# Patient Record
Sex: MALE | Race: WHITE | NOT HISPANIC OR LATINO
[De-identification: names, ages, dates, MRNs, and addresses within clinical notes are randomized per-mention and may not be internally consistent; named-entity substitution may affect disease eponyms.]

---

## 2017-02-15 ENCOUNTER — MEDICATION RENEWAL (OUTPATIENT)
Age: 57
End: 2017-02-15

## 2017-03-22 ENCOUNTER — APPOINTMENT (OUTPATIENT)
Dept: FAMILY MEDICINE | Facility: CLINIC | Age: 57
End: 2017-03-22

## 2017-03-22 VITALS
HEIGHT: 69 IN | WEIGHT: 185 LBS | SYSTOLIC BLOOD PRESSURE: 130 MMHG | DIASTOLIC BLOOD PRESSURE: 80 MMHG | BODY MASS INDEX: 27.4 KG/M2

## 2017-03-22 DIAGNOSIS — L04.9 ACUTE LYMPHADENITIS, UNSPECIFIED: ICD-10-CM

## 2017-03-22 DIAGNOSIS — R53.81 OTHER MALAISE: ICD-10-CM

## 2017-03-22 DIAGNOSIS — R53.83 OTHER MALAISE: ICD-10-CM

## 2017-03-22 DIAGNOSIS — R68.84 JAW PAIN: ICD-10-CM

## 2017-03-22 DIAGNOSIS — R10.9 UNSPECIFIED ABDOMINAL PAIN: ICD-10-CM

## 2017-03-22 LAB
BASOPHILS # BLD AUTO: 0.05 K/UL
BASOPHILS NFR BLD AUTO: 0.6 %
EOSINOPHIL # BLD AUTO: 0.13 K/UL
EOSINOPHIL NFR BLD AUTO: 1.6 %
HCT VFR BLD CALC: 44.7 %
HGB BLD-MCNC: 15.5 G/DL
IMM GRANULOCYTES NFR BLD AUTO: 0.5 %
LYMPHOCYTES # BLD AUTO: 2.52 K/UL
LYMPHOCYTES NFR BLD AUTO: 31.1 %
MAN DIFF?: NORMAL
MCHC RBC-ENTMCNC: 33.8 PG
MCHC RBC-ENTMCNC: 34.7 GM/DL
MCV RBC AUTO: 97.6 FL
MONOCYTES # BLD AUTO: 0.62 K/UL
MONOCYTES NFR BLD AUTO: 7.7 %
NEUTROPHILS # BLD AUTO: 4.73 K/UL
NEUTROPHILS NFR BLD AUTO: 58.5 %
PLATELET # BLD AUTO: 245 K/UL
RBC # BLD: 4.58 M/UL
RBC # FLD: 12.1 %
WBC # FLD AUTO: 8.09 K/UL

## 2017-03-23 LAB
ALBUMIN SERPL ELPH-MCNC: 4.5 G/DL
ALP BLD-CCNC: 83 U/L
ALT SERPL-CCNC: 25 U/L
ANION GAP SERPL CALC-SCNC: 18 MMOL/L
AST SERPL-CCNC: 24 U/L
BILIRUB SERPL-MCNC: 0.4 MG/DL
BUN SERPL-MCNC: 11 MG/DL
CALCIUM SERPL-MCNC: 9.6 MG/DL
CHLORIDE SERPL-SCNC: 101 MMOL/L
CHOLEST SERPL-MCNC: 204 MG/DL
CHOLEST/HDLC SERPL: 4.3 RATIO
CO2 SERPL-SCNC: 21 MMOL/L
CREAT SERPL-MCNC: 0.96 MG/DL
GLUCOSE SERPL-MCNC: 87 MG/DL
HDLC SERPL-MCNC: 47 MG/DL
LDLC SERPL CALC-MCNC: 129 MG/DL
POTASSIUM SERPL-SCNC: 4.3 MMOL/L
PROT SERPL-MCNC: 7.1 G/DL
SODIUM SERPL-SCNC: 140 MMOL/L
TRIGL SERPL-MCNC: 139 MG/DL
TSH SERPL-ACNC: 0.94 UIU/ML

## 2017-04-08 ENCOUNTER — TRANSCRIPTION ENCOUNTER (OUTPATIENT)
Age: 57
End: 2017-04-08

## 2017-06-02 ENCOUNTER — APPOINTMENT (OUTPATIENT)
Dept: FAMILY MEDICINE | Facility: CLINIC | Age: 57
End: 2017-06-02

## 2017-06-02 VITALS — DIASTOLIC BLOOD PRESSURE: 86 MMHG | SYSTOLIC BLOOD PRESSURE: 130 MMHG

## 2017-06-02 VITALS
HEIGHT: 69 IN | SYSTOLIC BLOOD PRESSURE: 150 MMHG | BODY MASS INDEX: 26.96 KG/M2 | DIASTOLIC BLOOD PRESSURE: 90 MMHG | WEIGHT: 182 LBS

## 2017-06-03 LAB
ALBUMIN SERPL ELPH-MCNC: 4.6 G/DL
ALP BLD-CCNC: 86 U/L
ALT SERPL-CCNC: 27 U/L
ANION GAP SERPL CALC-SCNC: 14 MMOL/L
APPEARANCE: CLEAR
AST SERPL-CCNC: 21 U/L
BACTERIA: NEGATIVE
BASOPHILS # BLD AUTO: 0.03 K/UL
BASOPHILS NFR BLD AUTO: 0.4 %
BILIRUB SERPL-MCNC: 0.5 MG/DL
BILIRUBIN URINE: NEGATIVE
BLOOD URINE: NEGATIVE
BUN SERPL-MCNC: 13 MG/DL
CALCIUM SERPL-MCNC: 9.4 MG/DL
CHLORIDE SERPL-SCNC: 99 MMOL/L
CHOLEST SERPL-MCNC: 211 MG/DL
CHOLEST/HDLC SERPL: 4.4 RATIO
CO2 SERPL-SCNC: 24 MMOL/L
COLOR: YELLOW
CREAT SERPL-MCNC: 0.93 MG/DL
EOSINOPHIL # BLD AUTO: 0.11 K/UL
EOSINOPHIL NFR BLD AUTO: 1.6 %
GLUCOSE QUALITATIVE U: NORMAL MG/DL
GLUCOSE SERPL-MCNC: 103 MG/DL
HCT VFR BLD CALC: 44.7 %
HDLC SERPL-MCNC: 48 MG/DL
HGB BLD-MCNC: 15.8 G/DL
HYALINE CASTS: 0 /LPF
IMM GRANULOCYTES NFR BLD AUTO: 0.4 %
KETONES URINE: NEGATIVE
LDLC SERPL CALC-MCNC: 128 MG/DL
LEUKOCYTE ESTERASE URINE: NEGATIVE
LYMPHOCYTES # BLD AUTO: 2.4 K/UL
LYMPHOCYTES NFR BLD AUTO: 35.8 %
MAN DIFF?: NORMAL
MCHC RBC-ENTMCNC: 35 PG
MCHC RBC-ENTMCNC: 35.3 GM/DL
MCV RBC AUTO: 98.9 FL
MICROSCOPIC-UA: NORMAL
MONOCYTES # BLD AUTO: 0.56 K/UL
MONOCYTES NFR BLD AUTO: 8.4 %
NEUTROPHILS # BLD AUTO: 3.57 K/UL
NEUTROPHILS NFR BLD AUTO: 53.4 %
NITRITE URINE: NEGATIVE
PH URINE: 6.5
PLATELET # BLD AUTO: 226 K/UL
POTASSIUM SERPL-SCNC: 4.4 MMOL/L
PROT SERPL-MCNC: 7.3 G/DL
PROTEIN URINE: NEGATIVE MG/DL
PSA SERPL-MCNC: 0.45 NG/ML
RBC # BLD: 4.52 M/UL
RBC # FLD: 12.2 %
RED BLOOD CELLS URINE: 0 /HPF
SODIUM SERPL-SCNC: 137 MMOL/L
SPECIFIC GRAVITY URINE: 1.01
SQUAMOUS EPITHELIAL CELLS: 0 /HPF
TRIGL SERPL-MCNC: 177 MG/DL
TSH SERPL-ACNC: 1.07 UIU/ML
UROBILINOGEN URINE: NORMAL MG/DL
WBC # FLD AUTO: 6.7 K/UL
WHITE BLOOD CELLS URINE: 0 /HPF

## 2017-08-03 ENCOUNTER — APPOINTMENT (OUTPATIENT)
Dept: OTOLARYNGOLOGY | Facility: CLINIC | Age: 57
End: 2017-08-03
Payer: COMMERCIAL

## 2017-08-03 VITALS
BODY MASS INDEX: 26.96 KG/M2 | HEIGHT: 69 IN | DIASTOLIC BLOOD PRESSURE: 85 MMHG | WEIGHT: 182 LBS | HEART RATE: 60 BPM | SYSTOLIC BLOOD PRESSURE: 154 MMHG

## 2017-08-03 PROCEDURE — 99244 OFF/OP CNSLTJ NEW/EST MOD 40: CPT | Mod: 25

## 2017-08-03 PROCEDURE — 31575 DIAGNOSTIC LARYNGOSCOPY: CPT

## 2017-08-03 PROCEDURE — 69210 REMOVE IMPACTED EAR WAX UNI: CPT

## 2017-09-14 ENCOUNTER — APPOINTMENT (OUTPATIENT)
Dept: OTOLARYNGOLOGY | Facility: CLINIC | Age: 57
End: 2017-09-14
Payer: COMMERCIAL

## 2017-09-14 VITALS
BODY MASS INDEX: 26.96 KG/M2 | WEIGHT: 182 LBS | DIASTOLIC BLOOD PRESSURE: 81 MMHG | SYSTOLIC BLOOD PRESSURE: 136 MMHG | HEART RATE: 70 BPM | HEIGHT: 69 IN

## 2017-09-14 PROCEDURE — 31575 DIAGNOSTIC LARYNGOSCOPY: CPT

## 2017-09-14 PROCEDURE — 99213 OFFICE O/P EST LOW 20 MIN: CPT | Mod: 25

## 2017-09-21 ENCOUNTER — TRANSCRIPTION ENCOUNTER (OUTPATIENT)
Age: 57
End: 2017-09-21

## 2017-10-10 ENCOUNTER — APPOINTMENT (OUTPATIENT)
Dept: FAMILY MEDICINE | Facility: CLINIC | Age: 57
End: 2017-10-10
Payer: COMMERCIAL

## 2017-10-10 VITALS
WEIGHT: 182 LBS | DIASTOLIC BLOOD PRESSURE: 78 MMHG | HEIGHT: 69 IN | SYSTOLIC BLOOD PRESSURE: 136 MMHG | BODY MASS INDEX: 26.96 KG/M2

## 2017-10-10 DIAGNOSIS — T14.8XXA OTHER INJURY OF UNSPECIFIED BODY REGION, INITIAL ENCOUNTER: ICD-10-CM

## 2017-10-10 DIAGNOSIS — R07.81 PLEURODYNIA: ICD-10-CM

## 2017-10-10 PROCEDURE — 99213 OFFICE O/P EST LOW 20 MIN: CPT

## 2017-12-14 ENCOUNTER — APPOINTMENT (OUTPATIENT)
Dept: OTOLARYNGOLOGY | Facility: CLINIC | Age: 57
End: 2017-12-14

## 2017-12-16 ENCOUNTER — RX RENEWAL (OUTPATIENT)
Age: 57
End: 2017-12-16

## 2018-03-19 ENCOUNTER — RX RENEWAL (OUTPATIENT)
Age: 58
End: 2018-03-19

## 2018-03-20 ENCOUNTER — APPOINTMENT (OUTPATIENT)
Dept: FAMILY MEDICINE | Facility: CLINIC | Age: 58
End: 2018-03-20
Payer: COMMERCIAL

## 2018-03-20 VITALS
DIASTOLIC BLOOD PRESSURE: 90 MMHG | HEART RATE: 65 BPM | SYSTOLIC BLOOD PRESSURE: 150 MMHG | BODY MASS INDEX: 26.96 KG/M2 | OXYGEN SATURATION: 94 % | HEIGHT: 69 IN | WEIGHT: 182 LBS

## 2018-03-20 PROCEDURE — 99214 OFFICE O/P EST MOD 30 MIN: CPT | Mod: 25

## 2018-03-20 PROCEDURE — 36415 COLL VENOUS BLD VENIPUNCTURE: CPT

## 2018-03-20 RX ORDER — VARENICLINE TARTRATE 0.5 (11)-1
0.5 MG X 11 & KIT ORAL
Qty: 1 | Refills: 0 | Status: DISCONTINUED | COMMUNITY
Start: 2017-06-02 | End: 2018-03-20

## 2018-03-20 RX ORDER — KETOCONAZOLE 20 MG/G
2 CREAM TOPICAL TWICE DAILY
Qty: 60 | Refills: 0 | Status: DISCONTINUED | COMMUNITY
Start: 2017-04-24 | End: 2018-03-20

## 2018-03-21 LAB
ALBUMIN SERPL ELPH-MCNC: 4.1 G/DL
ALP BLD-CCNC: 75 U/L
ALT SERPL-CCNC: 20 U/L
ANION GAP SERPL CALC-SCNC: 13 MMOL/L
AST SERPL-CCNC: 18 U/L
BASOPHILS # BLD AUTO: 0.06 K/UL
BASOPHILS NFR BLD AUTO: 0.8 %
BILIRUB SERPL-MCNC: 0.3 MG/DL
BUN SERPL-MCNC: 13 MG/DL
CALCIUM SERPL-MCNC: 8.9 MG/DL
CHLORIDE SERPL-SCNC: 104 MMOL/L
CHOLEST SERPL-MCNC: 209 MG/DL
CHOLEST/HDLC SERPL: 4.8 RATIO
CO2 SERPL-SCNC: 24 MMOL/L
CREAT SERPL-MCNC: 0.98 MG/DL
EOSINOPHIL # BLD AUTO: 0.15 K/UL
EOSINOPHIL NFR BLD AUTO: 2 %
GLUCOSE SERPL-MCNC: 95 MG/DL
HCT VFR BLD CALC: 45 %
HDLC SERPL-MCNC: 44 MG/DL
HGB BLD-MCNC: 14.9 G/DL
IMM GRANULOCYTES NFR BLD AUTO: 0.3 %
LDLC SERPL CALC-MCNC: 133 MG/DL
LYMPHOCYTES # BLD AUTO: 1.93 K/UL
LYMPHOCYTES NFR BLD AUTO: 26.2 %
MAN DIFF?: NORMAL
MCHC RBC-ENTMCNC: 33.1 GM/DL
MCHC RBC-ENTMCNC: 34.2 PG
MCV RBC AUTO: 103.2 FL
MONOCYTES # BLD AUTO: 0.63 K/UL
MONOCYTES NFR BLD AUTO: 8.5 %
NEUTROPHILS # BLD AUTO: 4.58 K/UL
NEUTROPHILS NFR BLD AUTO: 62.2 %
PLATELET # BLD AUTO: 235 K/UL
POTASSIUM SERPL-SCNC: 4.8 MMOL/L
PROT SERPL-MCNC: 6.8 G/DL
RBC # BLD: 4.36 M/UL
RBC # FLD: 12.6 %
SODIUM SERPL-SCNC: 141 MMOL/L
T4 FREE SERPL-MCNC: 1.2 NG/DL
TRIGL SERPL-MCNC: 158 MG/DL
TSH SERPL-ACNC: 1.1 UIU/ML
WBC # FLD AUTO: 7.37 K/UL

## 2018-04-02 ENCOUNTER — APPOINTMENT (OUTPATIENT)
Dept: FAMILY MEDICINE | Facility: CLINIC | Age: 58
End: 2018-04-02
Payer: COMMERCIAL

## 2018-04-02 VITALS
DIASTOLIC BLOOD PRESSURE: 75 MMHG | HEIGHT: 69 IN | SYSTOLIC BLOOD PRESSURE: 135 MMHG | HEART RATE: 73 BPM | WEIGHT: 182 LBS | BODY MASS INDEX: 26.96 KG/M2 | OXYGEN SATURATION: 96 %

## 2018-04-02 PROCEDURE — 99214 OFFICE O/P EST MOD 30 MIN: CPT

## 2018-04-02 RX ORDER — CYCLOBENZAPRINE HYDROCHLORIDE 10 MG/1
10 TABLET, FILM COATED ORAL
Qty: 21 | Refills: 0 | Status: DISCONTINUED | COMMUNITY
Start: 2017-09-20 | End: 2018-04-02

## 2018-04-02 RX ORDER — NAPROXEN 500 MG/1
500 TABLET, DELAYED RELEASE ORAL
Qty: 30 | Refills: 0 | Status: DISCONTINUED | COMMUNITY
Start: 2017-09-20 | End: 2018-04-02

## 2018-04-17 ENCOUNTER — APPOINTMENT (OUTPATIENT)
Dept: OTOLARYNGOLOGY | Facility: CLINIC | Age: 58
End: 2018-04-17
Payer: COMMERCIAL

## 2018-04-17 VITALS
HEART RATE: 62 BPM | SYSTOLIC BLOOD PRESSURE: 162 MMHG | BODY MASS INDEX: 28.58 KG/M2 | DIASTOLIC BLOOD PRESSURE: 86 MMHG | WEIGHT: 193 LBS | HEIGHT: 69 IN

## 2018-04-17 DIAGNOSIS — B97.89 ACUTE UPPER RESPIRATORY INFECTION, UNSPECIFIED: ICD-10-CM

## 2018-04-17 DIAGNOSIS — J06.9 ACUTE UPPER RESPIRATORY INFECTION, UNSPECIFIED: ICD-10-CM

## 2018-04-17 PROCEDURE — 69210 REMOVE IMPACTED EAR WAX UNI: CPT

## 2018-04-17 PROCEDURE — 99214 OFFICE O/P EST MOD 30 MIN: CPT | Mod: 25

## 2018-04-17 PROCEDURE — 31231 NASAL ENDOSCOPY DX: CPT

## 2018-04-19 ENCOUNTER — RX RENEWAL (OUTPATIENT)
Age: 58
End: 2018-04-19

## 2018-07-16 ENCOUNTER — RX RENEWAL (OUTPATIENT)
Age: 58
End: 2018-07-16

## 2018-10-08 ENCOUNTER — APPOINTMENT (OUTPATIENT)
Dept: FAMILY MEDICINE | Facility: CLINIC | Age: 58
End: 2018-10-08
Payer: COMMERCIAL

## 2018-10-08 VITALS
WEIGHT: 185 LBS | OXYGEN SATURATION: 98 % | HEART RATE: 63 BPM | SYSTOLIC BLOOD PRESSURE: 130 MMHG | BODY MASS INDEX: 28.04 KG/M2 | DIASTOLIC BLOOD PRESSURE: 90 MMHG | HEIGHT: 68 IN

## 2018-10-08 PROCEDURE — 36415 COLL VENOUS BLD VENIPUNCTURE: CPT

## 2018-10-08 PROCEDURE — 99214 OFFICE O/P EST MOD 30 MIN: CPT | Mod: 25

## 2018-10-08 RX ORDER — IBUPROFEN 600 MG/1
600 TABLET, FILM COATED ORAL 3 TIMES DAILY
Qty: 42 | Refills: 0 | Status: DISCONTINUED | COMMUNITY
Start: 2017-10-10 | End: 2018-10-08

## 2018-10-08 NOTE — PHYSICAL EXAM
[Well Nourished] : well nourished [Supple] : supple [Clear to Auscultation] : lungs were clear to auscultation bilaterally [Regular Rhythm] : with a regular rhythm [Normal S1, S2] : normal S1 and S2 [No CVA Tenderness] : no CVA  tenderness [No Joint Swelling] : no joint swelling [Normal Gait] : normal gait [Normal Affect] : the affect was normal

## 2018-10-08 NOTE — HISTORY OF PRESENT ILLNESS
[FreeTextEntry1] : fu [de-identified] : 57 year old male is here for a followup visit for blood pressure. Patient is here for medication renewals and for blood work discussion. Medications and allergies were reviewed and assessed.  There has been no new medications since the last visit. Patient is feeling well with no active changes or issues since His last visit.\par

## 2018-10-09 LAB
ALBUMIN SERPL ELPH-MCNC: 4.3 G/DL
ALP BLD-CCNC: 76 U/L
ALT SERPL-CCNC: 31 U/L
ANION GAP SERPL CALC-SCNC: 13 MMOL/L
AST SERPL-CCNC: 23 U/L
BASOPHILS # BLD AUTO: 0.06 K/UL
BASOPHILS NFR BLD AUTO: 1.2 %
BILIRUB SERPL-MCNC: 0.4 MG/DL
BUN SERPL-MCNC: 7 MG/DL
CALCIUM SERPL-MCNC: 9.2 MG/DL
CHLORIDE SERPL-SCNC: 104 MMOL/L
CHOLEST SERPL-MCNC: 177 MG/DL
CHOLEST/HDLC SERPL: 3.6 RATIO
CO2 SERPL-SCNC: 25 MMOL/L
CREAT SERPL-MCNC: 0.97 MG/DL
EOSINOPHIL # BLD AUTO: 0.17 K/UL
EOSINOPHIL NFR BLD AUTO: 3.4 %
GLUCOSE SERPL-MCNC: 94 MG/DL
HBA1C MFR BLD HPLC: 5.5 %
HCT VFR BLD CALC: 43.6 %
HDLC SERPL-MCNC: 49 MG/DL
HGB BLD-MCNC: 15.2 G/DL
IMM GRANULOCYTES NFR BLD AUTO: 0.6 %
LDLC SERPL CALC-MCNC: 103 MG/DL
LYMPHOCYTES # BLD AUTO: 1.89 K/UL
LYMPHOCYTES NFR BLD AUTO: 37.4 %
MAN DIFF?: NORMAL
MCHC RBC-ENTMCNC: 34.9 GM/DL
MCHC RBC-ENTMCNC: 34.9 PG
MCV RBC AUTO: 100 FL
MONOCYTES # BLD AUTO: 0.32 K/UL
MONOCYTES NFR BLD AUTO: 6.3 %
NEUTROPHILS # BLD AUTO: 2.58 K/UL
NEUTROPHILS NFR BLD AUTO: 51.1 %
PLATELET # BLD AUTO: 244 K/UL
POTASSIUM SERPL-SCNC: 4.4 MMOL/L
PROT SERPL-MCNC: 6.7 G/DL
RBC # BLD: 4.36 M/UL
RBC # FLD: 12.5 %
SODIUM SERPL-SCNC: 142 MMOL/L
TRIGL SERPL-MCNC: 126 MG/DL
WBC # FLD AUTO: 5.05 K/UL

## 2018-10-12 ENCOUNTER — RX RENEWAL (OUTPATIENT)
Age: 58
End: 2018-10-12

## 2018-10-23 ENCOUNTER — APPOINTMENT (OUTPATIENT)
Dept: OTOLARYNGOLOGY | Facility: CLINIC | Age: 58
End: 2018-10-23
Payer: COMMERCIAL

## 2018-10-23 VITALS
SYSTOLIC BLOOD PRESSURE: 180 MMHG | HEART RATE: 60 BPM | HEIGHT: 68 IN | BODY MASS INDEX: 28.04 KG/M2 | DIASTOLIC BLOOD PRESSURE: 91 MMHG | WEIGHT: 185 LBS

## 2018-10-23 PROCEDURE — 69210 REMOVE IMPACTED EAR WAX UNI: CPT

## 2018-10-23 PROCEDURE — 99214 OFFICE O/P EST MOD 30 MIN: CPT | Mod: 25

## 2018-10-23 PROCEDURE — 31231 NASAL ENDOSCOPY DX: CPT

## 2018-10-23 RX ORDER — FLUTICASONE PROPIONATE 50 UG/1
50 SPRAY, METERED NASAL DAILY
Qty: 1 | Refills: 3 | Status: ACTIVE | COMMUNITY
Start: 2018-10-23 | End: 1900-01-01

## 2018-10-23 RX ORDER — ELASTIC BANDAGE 1"X2.2YD
2300-700 BANDAGE TOPICAL
Qty: 1 | Refills: 0 | Status: ACTIVE | COMMUNITY
Start: 2018-10-23 | End: 1900-01-01

## 2018-12-11 ENCOUNTER — MEDICATION RENEWAL (OUTPATIENT)
Age: 58
End: 2018-12-11

## 2019-01-11 ENCOUNTER — MEDICATION RENEWAL (OUTPATIENT)
Age: 59
End: 2019-01-11

## 2019-01-31 ENCOUNTER — MEDICATION RENEWAL (OUTPATIENT)
Age: 59
End: 2019-01-31

## 2019-03-27 ENCOUNTER — RX RENEWAL (OUTPATIENT)
Age: 59
End: 2019-03-27

## 2019-04-02 ENCOUNTER — NON-APPOINTMENT (OUTPATIENT)
Age: 59
End: 2019-04-02

## 2019-04-02 ENCOUNTER — APPOINTMENT (OUTPATIENT)
Dept: FAMILY MEDICINE | Facility: CLINIC | Age: 59
End: 2019-04-02
Payer: COMMERCIAL

## 2019-04-02 VITALS
HEIGHT: 68 IN | BODY MASS INDEX: 28.79 KG/M2 | DIASTOLIC BLOOD PRESSURE: 95 MMHG | HEART RATE: 72 BPM | WEIGHT: 190 LBS | OXYGEN SATURATION: 98 % | RESPIRATION RATE: 16 BRPM | SYSTOLIC BLOOD PRESSURE: 145 MMHG

## 2019-04-02 PROCEDURE — 93000 ELECTROCARDIOGRAM COMPLETE: CPT

## 2019-04-02 PROCEDURE — 99406 BEHAV CHNG SMOKING 3-10 MIN: CPT

## 2019-04-02 PROCEDURE — 36415 COLL VENOUS BLD VENIPUNCTURE: CPT

## 2019-04-02 PROCEDURE — 99396 PREV VISIT EST AGE 40-64: CPT | Mod: 25

## 2019-04-02 PROCEDURE — G0296 VISIT TO DETERM LDCT ELIG: CPT

## 2019-04-02 NOTE — HISTORY OF PRESENT ILLNESS
[Cigarettes ___ packs/day] : Patient smokes [unfilled] packs of cigarettes per day [Discussed Risks and Advised to Quit Smoking] : Discussed risks and advised to quit smoking [Smoking Cessation Counseling Given (more than 3 min less than10 min) and Resources Provided] : Smoking cessation counseling given (more than 3 min less than 10 min) and resources provided [Ready] : Patient is ready for cessation intervention [Pre-contemplation] : Pre-contemplation: patient is not planning to quit within the next 6 months [Withdrawal symptoms] : withdrawal symptoms [Quit Smoking] : Quit Smoking [de-identified] : 59 year old male  here for annual well visit. Patient's blood work was drawn and medications reviewed. Patient's past medical history was reviewed, allergies verified and problems were identified and assessed. Patients medications were reviewed. Patient is feeling well with no new or active complaints at this time.\par \par

## 2019-04-02 NOTE — HEALTH RISK ASSESSMENT
[Good] : ~his/her~  mood as  good [21-25] : 21-25 [] : No [No falls in past year] : Patient reported no falls in the past year [0] : 2) Feeling down, depressed, or hopeless: Not at all (0) [[id number]] : 0 [Patient reported colonoscopy was normal] : Patient reported colonoscopy was normal [HIV Test offered] : HIV Test offered [Hepatitis C test offered] : Hepatitis C test offered [With Significant Other] : lives with significant other [Employed] : employed [] :  [# Of Children ___] : has [unfilled] children [Fully functional (bathing, dressing, toileting, transferring, walking, feeding)] : Fully functional (bathing, dressing, toileting, transferring, walking, feeding) [ColonoscopyDate] : 2011 [de-identified] : retired  [With Patient/Caregiver] : With Patient/Caregiver [AdvancecareDate] : 04/02/2019 [FreeTextEntry4] : none

## 2019-04-02 NOTE — ASSESSMENT
[FreeTextEntry1] : Patient was counseled on healthy eating habits, on daily exercise and stress relief. All medications and allergies were reviewed with the patient and any adjustments necessary were made. Patient was counseled to try engage in an exercise activity for at least 30 minutes 3-4 times a week.  Patient was advised to eat a diet low in fat and carbohydrates and high in protein, with plenty of vegetables. Patient was advised to try and engage in relaxing activities whenever possible.\par The patients blood was draw in office and will be followed and assessed for any issues.  Patient was told to return to the office if any issues arise.  Unless otherwise stated, the patient is to continue all other medications as previously prescribed.\par \par Patient with long smoking history. Reviewed history and smoking cessation strategies. Discussed benefits of low dose CT scan for early detection of lung cancer.  Risks and benefits reviewed.  Some risks discussed were radiation exposure, false positives, incidental findings. Will order low dose CT of lungs for assessment.\par \par Smoking cessation was discussed with patient. All options were explained to the patient including available medications and their mechanism of action. Side affects and black box warnings were discussed as well. Patient was counseled on risks and benefits of available medications and risks of continued smoking including death. Greater than 5 minutes spent counseling patient.\par \par increase valsartan to 160 and reassess in two weeks\par

## 2019-04-03 LAB
ALBUMIN SERPL ELPH-MCNC: 4.3 G/DL
ALP BLD-CCNC: 82 U/L
ALT SERPL-CCNC: 27 U/L
ANION GAP SERPL CALC-SCNC: 13 MMOL/L
APPEARANCE: CLEAR
AST SERPL-CCNC: 23 U/L
BASOPHILS # BLD AUTO: 0.04 K/UL
BASOPHILS NFR BLD AUTO: 0.9 %
BILIRUB SERPL-MCNC: 0.3 MG/DL
BILIRUBIN URINE: NEGATIVE
BLOOD URINE: NEGATIVE
BUN SERPL-MCNC: 13 MG/DL
CALCIUM SERPL-MCNC: 8.9 MG/DL
CHLORIDE SERPL-SCNC: 104 MMOL/L
CHOLEST SERPL-MCNC: 181 MG/DL
CHOLEST/HDLC SERPL: 4.6 RATIO
CO2 SERPL-SCNC: 24 MMOL/L
COLOR: YELLOW
CREAT SERPL-MCNC: 0.98 MG/DL
EOSINOPHIL # BLD AUTO: 0.12 K/UL
EOSINOPHIL NFR BLD AUTO: 2.6 %
ESTIMATED AVERAGE GLUCOSE: 105 MG/DL
GLUCOSE QUALITATIVE U: NEGATIVE
GLUCOSE SERPL-MCNC: 93 MG/DL
HBA1C MFR BLD HPLC: 5.3 %
HCT VFR BLD CALC: 44.6 %
HDLC SERPL-MCNC: 39 MG/DL
HGB BLD-MCNC: 14.7 G/DL
IMM GRANULOCYTES NFR BLD AUTO: 0.7 %
KETONES URINE: NEGATIVE
LDLC SERPL CALC-MCNC: 84 MG/DL
LEUKOCYTE ESTERASE URINE: NEGATIVE
LYMPHOCYTES # BLD AUTO: 1.25 K/UL
LYMPHOCYTES NFR BLD AUTO: 27.5 %
MAN DIFF?: NORMAL
MCHC RBC-ENTMCNC: 33 GM/DL
MCHC RBC-ENTMCNC: 33.6 PG
MCV RBC AUTO: 101.8 FL
MONOCYTES # BLD AUTO: 0.54 K/UL
MONOCYTES NFR BLD AUTO: 11.9 %
NEUTROPHILS # BLD AUTO: 2.57 K/UL
NEUTROPHILS NFR BLD AUTO: 56.4 %
NITRITE URINE: NEGATIVE
PH URINE: 6
PLATELET # BLD AUTO: 212 K/UL
POTASSIUM SERPL-SCNC: 4.2 MMOL/L
PROT SERPL-MCNC: 6.7 G/DL
PROTEIN URINE: NORMAL
PSA SERPL-MCNC: 0.4 NG/ML
RBC # BLD: 4.38 M/UL
RBC # FLD: 12.2 %
SODIUM SERPL-SCNC: 141 MMOL/L
SPECIFIC GRAVITY URINE: 1.02
TRIGL SERPL-MCNC: 290 MG/DL
TSH SERPL-ACNC: 1.1 UIU/ML
UROBILINOGEN URINE: NORMAL
WBC # FLD AUTO: 4.55 K/UL

## 2019-04-23 ENCOUNTER — APPOINTMENT (OUTPATIENT)
Dept: OTOLARYNGOLOGY | Facility: CLINIC | Age: 59
End: 2019-04-23
Payer: COMMERCIAL

## 2019-04-23 VITALS
HEIGHT: 68 IN | WEIGHT: 190 LBS | DIASTOLIC BLOOD PRESSURE: 88 MMHG | SYSTOLIC BLOOD PRESSURE: 156 MMHG | HEART RATE: 62 BPM | BODY MASS INDEX: 28.79 KG/M2

## 2019-04-23 PROCEDURE — 69210 REMOVE IMPACTED EAR WAX UNI: CPT

## 2019-04-23 PROCEDURE — 31231 NASAL ENDOSCOPY DX: CPT

## 2019-04-23 PROCEDURE — 99214 OFFICE O/P EST MOD 30 MIN: CPT | Mod: 25

## 2019-04-23 NOTE — PROCEDURE
[FreeTextEntry3] : Procedure - Cerumen Removal. \par After informed verbal consent is obtained, cerumen is removed from the left ear canal with suction.  Normal appearing canal following removal.\par  [de-identified] : Procedure performed: laryngeal Endoscopy- Diagnostic\par Pre-op/post op indication: globus\par Verbal and/or written consent obtained from patient\par Scope #:21 , flexible fiber optic telescope used.\par Scope was introduced through the nose passed on the floor of the nose to the nasopharynx and then followed down the soft palate to the lower pharynx. The tongue Base, Larynx, Hypopharynx were examined. Base of tongue was symmetric, vallecular was clear, epiglottis was not deformed, Glottis with fully mobile vocal cords without lesions or masses. Visualized subglottis clear. Postcricoid area with erythema and edema.  Pos intra-arytenoid bar. No pooling of secretions, masses or lesions. Airway patent, no foreign body visualized. No glottic/supraglottic edema. True vocal cords, arytenoids, vestibular folds, ventricles, pyriform sinuses, and aryepiglottic folds appear normal bilaterally. Vocal cords mobile with good contact b/l.\par  [FreeTextEntry6] : Procedure performed: Nasal Endoscopy- Diagnostic\par Pre / post -procedure indication(s): nasal congestion\par Verbal and/or written consent obtained from patient\par Scope #: 21,  flexible fiber optic telescope used\par The scope was introduced in the nasal passage between the middle and inferior turbinates to exam the inferior portion of the middle meatus and the fontanelle, as well as the maxillary ostia.  Next, the scope was passed medically and posteriorly to the middle turbinates to examine the sphenoethmoid recess and the superior turbinate region.\par Upon visualization the finders are as follows:\par Nasal Septum: severe right side septal deviation \par B/L: Mucosa: mucosal erythema and edema, Mucous: scant, Polyp: not seen, Inferior Turbinate, Middle and Superior Turbinate:turbinate hypertrophy , Inferior Meatus: narrow, Middle Meatus: narrow, Super Meatus:normal, Sphenoethmoidal Recess: clear.  Eustachian tube clear. \par The patient tolerated the procedure well\par

## 2019-04-23 NOTE — ASSESSMENT
[FreeTextEntry1] : dysphonia likely due to LPR and muscle tension, no nodules - with improvement on reflux precautions and PPI but not resolved changes on exam. pt feels doing well symptomatically \par will proceed to start lifestyle regiment to reduce overproduction of acid and reduce laryngeal reflux including avoiding caffein, alcohol, eating before bed, spicy and fatty foods, and head elevation at night etc. Handout detailing regiment also given\par doing reflux regiment - discussed details \par PPI 40 QD\par will do regular follow up with GI, states had EGD 4-5 years ago \par \par right NSD with congestion\par - continue Flonase, add nasal saline irrigation\par \par \par Left CI- removed

## 2019-04-23 NOTE — HISTORY OF PRESENT ILLNESS
[de-identified] : 60 y/o with history of dysphonia. Still with hoarseness but stable, same as last visit Oct. 2018. Taking Omeprazole 4-5 times a week. No throat pain, no difficulty swallowing. \par \par Also with history of nasal congestion. Started on Flonase 6 months ago, using 3-4 times a week with improvement in nasal congestion. No runny nose, facial pain, sinus pressure or PND.

## 2019-04-23 NOTE — CONSULT LETTER
[FreeTextEntry1] : Dear Dr. CEDRIC BLAIR \par I had the pleasure of evaluating your patient DESTINY JACKMAN, thank you for allowing us to participate in their care. please see full note detailing our visit below.\par If you have any questions, please do not hesitate to call me and I would be happy to discuss further. \par \par Jack Woodall M.D.\par Attending Physician,  \par Department of Otolaryngology - Head and Neck Surgery\par [facility] \par Office: [phone]\par Fax: [phone]\par \par

## 2019-04-23 NOTE — PHYSICAL EXAM
[Midline] : trachea located in midline position [Normal] : no rashes [de-identified] : dry cerumen- left ear

## 2019-05-08 ENCOUNTER — APPOINTMENT (OUTPATIENT)
Dept: CARDIOLOGY | Facility: CLINIC | Age: 59
End: 2019-05-08
Payer: COMMERCIAL

## 2019-05-08 VITALS — DIASTOLIC BLOOD PRESSURE: 69 MMHG | HEART RATE: 70 BPM | SYSTOLIC BLOOD PRESSURE: 128 MMHG | OXYGEN SATURATION: 97 %

## 2019-05-08 VITALS
OXYGEN SATURATION: 98 % | WEIGHT: 194 LBS | BODY MASS INDEX: 29.4 KG/M2 | HEIGHT: 68 IN | SYSTOLIC BLOOD PRESSURE: 141 MMHG | RESPIRATION RATE: 16 BRPM | DIASTOLIC BLOOD PRESSURE: 71 MMHG | HEART RATE: 67 BPM

## 2019-05-08 PROCEDURE — 99205 OFFICE O/P NEW HI 60 MIN: CPT

## 2019-05-08 NOTE — DISCUSSION/SUMMARY
[Hyperlipidemia] : hyperlipidemia [Begin] : beginning statins [Smoking Cessation] : smoking cessation [Diet Modification] : diet modification [Exercise] : exercise [<100] : goal is <100 [<150] : goal is <150 [>40] : goal is >40 [Hypertension] : hypertension [Stable] : stable [Responding to Treatment] : responding to treatment [Stage II] : Stage II [NYHA Class II] : NYHA Class II [Essential Hypertension] : essential hypertension [Hypertensive Cardiomyopathy] : hypertensive cardiomyopathy [Sleep Apnea] : sleep apnea [Outpatient Evaluation] : outpatient evaluation [Echocardiogram] : echocardiogram [Exercise Regimen] : an exercise regimen [Continue] : continuing beta blockers [Weight Loss] : weight loss [Patient] : the patient [Sodium Restriction] : sodium restriction [At Goal] : The HDL is not at goal [de-identified] : start atorvastatin (Lipitor) 10 mg tablets, one tablet daily at bedtime [de-identified] : 84 mg/dL on 4/2/19 [FreeTextEntry5] : 290 mg/dL on 4/2/19 (goal is for fasting specimen) [de-identified] : 39 mg/dL on 4/2/19 [de-identified] : overall BP was at goal after accounting for white coat effect. There was mild asymptomatic orthostatic hypotension, likely due to antihypertensive medication.  Initial office BP, obtained by physician, was 141/71 mm Hg. There were no significant differences between right and left arm measurements. Fifteen serial automated (Dinamap ™) hemodynamic measurements were obtained. Mean BP for all automated measurements was 130/67 +/- 7.9/2.9 mm Hg with mean HR 71 +/- 3.4 bpm and mean oxygen saturation by pulse oximetry 96 +/- 0.6% on room air. The last seated BP was 131/69 mm Hg with HR 71 bpm. Upon standing, BP decreased to 110/68 mm Hg and HR increased to 76 bpm. The patient did not experience lightheadedness or palpitations upon standing. He was not wearing compression stockings. Physical examination was noted for silver wiring of retinal arterioles, a sign of hypertensive target organ damage. Serum creatinine was 0.98 mg/dL on 4/2/19 (normal 0.5 – 5.3). Estimated glomerular filtration rate (eGFR) was 83 mL/min/1.73 sqm on 4/2/19 (normal >= 60). Serum potassium was 4.2 mmol/L on 4/2/19 (normal 3.5 – 5.3). Urinalysis done 4/2/19 noted trace protein and no glucose. TSH was 1.10 uIU/mL on 4/2/19 (normal 0.27 – 4.20). Exertional dyspnea may be due to underlying pulmonary disease  from smoking. There was prolonged expiratory phase with end expiratory wheeze on examination (during forced exhalation). However, this symptom may also be a manifestation of hypertension (e.g. exercise induced hypertension, heart failure with preserved ejection fraction, hypertensive heart disease) [de-identified] : maintain metoprolol succinate ER 25 mg tablets, one tablet daily. Maintain valsartan 160 mg tablets, one tablet daily [de-identified] : assessment for obstructive sleep apnea (OMAR) [de-identified] : See below for instructions regarding nasal saline washing. I recommend Nasaline (tm) Irrigation system [FreeTextEntry1] : \par WEIGHT GOALS:\par \par Category				BMI range - kg/m2	BMI Prime\par Very severely underweight		less than 15		less than 0.60	\par Severely underweight			from 15.0 to 16.0		from 0.60 to 0.64	\par Underweight				from 16.0 to 18.5		from 0.64 to 0.74	\par Normal (healthy weight)			from 18.5 to 25		from 0.74 to 1.0	\par Overweight				from 25 to 30		from 1.0 to 1.2	\par Obese Class I (Moderately obese)		from 30 to 35		from 1.2 to 1.4	\par Obese Class II (Severely obese)		from 35 to 40		from 1.4 to 1.6	\par Obese Class III (Very severely obese)	over 40			over 1.6	\par \par Your current weight is 194 lbs. Given current weight and height 5'8", your calculated body mass index (BMI) is 29.5 kg/sqm. Normal BMI is 18.5-25 kg/sqm. Thus current weight is in the overweight category. Abdominal waist circumference is measured at the level of the umbilicus and is thus not a pants waist measurement. Your current abdominal waist circumference is 42.5 inches. Normal abdominal waist circumference is < 32 inches for women and < 37 inches for men.\par \par DIETARY SALT (SODIUM); DASH DIET AND BLOOD PRESSURE:\par To decrease the sodium in your diet: \par · Use fresh vegetables and foods as much as possible.\par · Avoid canned and processed foods. Cured meats such as catalan, ham, and sausages are high in salt.\par · Try using different herbs and spices in your cooking instead of salt.\par · In restaurants, avoid foods with sauces, cheese, and cured meats. Ask for low-sodium choices.\par To get more potassium in your diet, eat:\par · Bananas, fresh or dried apricots, peaches, citrus fruits, melons\par · Cauliflower, broccoli, tomatoes, carrots, raw spinach, beet greens, potatoes\par To get more magnesium in your diet, eat:\par · Whole grain foods, leafy green vegetables, dried fruits\par • Fish and seafood, poultry \par To get more calcium in your diet, eat:\par · Nonfat milk, yogurt, and low-fat cheeses \par · Bow and sardines\par · Cooked dried beans\par · Broccoli, kale, and bok shane\par · Tofu or soybean curd\par DASH stands for "dietary approaches to stop hypertension." The DASH diet is low in total and saturated fat. It is rich in fruits, vegetables, and low-fat dairy foods. The diet allows you to get natural fiber, calcium, and magnesium from food. It prevents or lowers high blood pressure. It can also help lower cholesterol in your blood. \par Don't change how you eat all at once. It's much more likely that you'll succeed if you make only one or two small changes at a time. Wait until those changes are a habit, then make a couple more changes. Some good starting steps include: \par Add one serving of vegetables to your meals at lunch and dinner. This is an easy way to help you get more vegetables in your diet. \par Have a piece of fruit as an afternoon or after-dinner snack. One glass of juice at breakfast is not enough fruit in your diet. \par Use half your usual amount of butter, margarine, or salad dressing. \par Buy nonfat salad dressing or nonfat sour cream.\par Follow this guide to select your menu of meals. The number of calories we want you to eat each day will tell you how many servings you can choose from each food group.\par Calories: 1600 2100 2600 3100  Servings Grains 6 7 ½ 10 ½ 12 ½  Vegetables 	 4 4 ½ 5 6 Fruit 4 5 5 6 Dairy (low-fat) 2 ½ 3 3 3 ½  Meat, poultry, fish ½ 1 ½ 2 2 ½  Nuts, seeds ½ ½ ½ 1 Fats and sweets 1 ½ 2 ½ 3 4\par Grains and grain products like breads and cereals provide energy, fiber and vitamins. Whole grains have more of these nutrients. One serving equals one of the following:\par Bagel, 1/2 medium; Barley, cooked 1/2 cup; Biscuit, country style 1 medium; Bread, whole wheat, white 1 slice; Cereals, cold or cooked, 1/2 cup; Cornbread, 1 medium piece; Crackers, liliana, 2; Crackers, saltine, 4; Dinner roll, 1medium; English muffin, ½; Hamburger bun, ½; Muffin, 1 medium; Pancake, 1 medium; Pasta, 1/2 cup cooked; [place], 1/2 large or 1 small; Popcorn, 1 cup popped; Pretzels, 1 ounce; Rice, white, brown, or wild, 1/2 cup cooked; Tortillas, corn or flour, 1 medium; Waffle, 1 medium; Wheat germ, 1/4 cup; \par Vegetables are rich sources of potassium, magnesium, and fiber. One serving is 1/2 cup of any of the following cooked vegetables:\par Asparagus, Beans (green, yellow), Beets, Broccoli, [place] Sprouts, Carrots, Cauliflower, [place]van, chicory, mustard and turnip (and other) greens, Corn, Kale, Lima beans, Mixed vegetables, Okra, Parsnips, Peas, green, Potatoes (1/2 medium or 1/2 cup mashed), Pumpkin, Rutabaga, Spaghetti or tomato sauce, Spinach, Squash (zucchini or yellow), Stewed tomatoes, Succotash, Sweet potatoes, Turnips, Yam \par Raw vegetables: Carrots,1/2 cup; Celery, 1/2 cup; Lettuce (kymberly, loose-leaf, green-leaf), 1 cup; Peppers, 1/2 cup; Spinach, 1 cup; Tomato, 1/2\par Fruits and fruit juices are important sources of potassium and magnesium. Fruits also contain fiber and are low in sodium and fat. One serving equals:\par Any fruit juice, # cup (6 ounces); Canned or frozen fruit, ½ cup (includes applesauce); Dried fruit, ¼ cup; \par Fresh fruit:\par Apple, 1 medium; Apricots, 2 medium; Banana, 1 medium; Berries, 1/2 cup; Melon, 1 wedge, or 1/2 cup; Cherries, 10; Grapefruit, 1/2; Grapes, 15; Kiwi, 1 medium; [place], 1/2 small; Nectarine, 1 medium; Orange, 1 medium; Peach, 1 medium; Pear, 1 medium; Pineapple, 1/2 cup; Plums, 2 medium; Tangerine, 1 large\par Dairy foods provide protein and calcium. Use low-fat or nonfat dairy products to cut down on fat. One serving equals:\par Skim milk, 1 cup (8 ounces); 1% low fat milk, 1 cup (8 ounces); 2% low fat milk, 1 cup (8 ounces) nonfat dry milk powder (1/3 cup); Low-fat cottage cheese, 1 cup (8 ounces); Parmesan cheese, 1 tablespoon; Mozzarella cheese, part skim, 1/4 cup (1 ounce); Low-fat cheddar cheese, 11/2 ounces; Ricotta cheese, part skim milk or nonfat, 1/4 cup (11/2 ounces); Other low fat or nonfat cheeses (11/2 oz.); Low-fat or nonfat yogurt, fruit-flavored or plain, 1 cup (8 ounces)\par Low-fat or nonfat frozen yogurt, 1/2 cup (4 ounces); Note: People who can't digest dairy products can try taking lactase enzyme pills or drops (available at drug and grocery stores) when they eat dairy. There is also milk available with the enzyme already added. Or you can buy lactose-free milk.\par Meat, poultry, and fish are good sources of protein and magnesium. One serving equals:\par Lean meat including beef, veal, or pork, 3 ounces cooked; Skinless, white meat poultry including turkey, chicken, 3 ounces; Fish and shellfish, 3 ounces cooked; Low-fat luncheon meats, 1 ounce; Egg, 1 medium; Tofu, 3 ounces\par Note: Three ounces of cooked meat is about the size of a deck of cards.\par Nuts, seeds, and legumes are rich sources of energy, magnesium, potassium, protein and fiber. Nuts and seeds are also high in fat, so portions should be small.\par Almonds, 1/3 cup; Beans such as kidney, chew, and navy, 1/2 cup cooked; Chickpeas and lentils, 1/2 cup cooked; Cashews, 1/3 cup; Filberts, 1/3 cup; Mixed nuts, 1/3 cup; Peanut butter, 2 tablespoons; Peanuts, 1/3 cup; Sesame seeds, 2 tablespoons; Sunflower seeds, 2 tablespoons; Tofu, regular, 3 ounces; Walnuts, 1/3 cup \par Following the above diet will give you about 27% fat in your diet. The goal is to have 30% or less of the calories you eat each day be from fat. To meet that goal, do not eat more than 2-3 servings daily of added fat. Also try to limit sweets. One serving equals:\par Butter or margarine, 1 teaspoon; Mayonnaise, 1 teaspoon; Low-fat mayonnaise, 1 tablespoon; Salad dressing, 1 tablespoon; Low-fat salad dressing, 2 tablespoons; Oil, 1 teaspoon (use olive, canola, safflower, or other vegetable oils); Candy, hard, 3 pieces; Jelly or jam, 1 tablespoon); Jell-O, 1/2 cup; Jelly beans, 1/2 ounce; Maple syrup, 1 tablespoon; Popsicle, 1; Sherbet or nonfat or low-fat frozen yogurt, 1/2 cup; Sugar, 1 tablespoon; Sugared lemonade or fruit punch, 1 cup (8 ounces); Note: Try diet fruit-flavored gelatin or frozen, canned, or fresh fruit for dessert.\par \par Small amounts of alcohol may have benefits to the heart and blood pressure. However, excess use of alcohol can cause damage to the brain, liver and other organs. It can lead to high blood pressure. Drinking more than two drinks (15 ml)  every day can raise your blood pressure. 15 ml of alcohol equals: \par • one 12-ounce bottle of beer \par • a half glass (5 ounces) of wine \par • 1 ounce (one shot) of 100 proof hard liquor\par \par SMOKING CESSATION:\par We all know the health risks of smoking, and most of us know that kicking the habit is the single biggest improvement to health a smoker can make. But that doesn’t make it any easier to kick the habit. Whether you’re a teen smoker or a lifetime pack-a-day smoker, quitting can be tough.\par To increase your chances of success, you need to be motivated, have social support, an understanding of what to expect, and a personal game plan. It is possible to learn how to replace your smoking habits, manage your cravings, and join the millions of people who have kicked the habit for good.\par Smoking tobacco is both a psychological habit and a physical addiction. The act of smoking is ingrained as a daily ritual and, at the same time, the nicotine from cigarettes provides a temporary, and addictive, high. Eliminating that regular fix of nicotine will cause your body to experience physical withdrawal symptoms and cravings. To successfully quit smoking, you’ll need to address both the habit and the addiction by changing your behavior and dealing with nicotine withdrawal symptoms.\par Relieving unpleasant and overwhelming feelings without cigarettes\par Managing unpleasant feelings such as stress, depression, loneliness, fear, and anxiety are some of the most common reasons why adults smoke. When you have a bad day, it can seem like your cigarettes are your only friend. Smoking can temporarily make feelings such as sadness, stress, anxiety, depression, and boredom evaporate into thin air. As much comfort as cigarettes provide, though, it’s important to remember that there are healthier (and more effective) ways to keep unpleasant feelings in check. These may include exercising, meditating, using sensory relaxation strategies, and practicing simple breathing exercises. \par For many people, an important aspect of quitting smoking is to find alternate ways to handle these difficult feelings without smoking. Even when cigarettes are no longer a part of your life, the painful and unpleasant feelings that may have prompted you to smoke in the past will still remain. So, it’s worth spending some time thinking about the different ways you intend to deal with stressful situations and the daily irritations that would normally have you reaching for a cigarette.\par Tailoring a personal game plan to your specific needs and desires can be a big help. List the reasons why you want to quit and then keep copies of the list in the places where you’d normally keep your cigarettes, such as in your jacket, purse, or car. Your reasons for quitting smoking might include:\par I will feel healthier and have more energy, whiter teeth and fresher breath.\par I will lower my risk for cancer, heart attacks, strokes, early death, cataracts, and skin wrinkling.\par I will make myself and my partner, friends, and family proud of me.\par I will no longer expose my children and others to the dangers of my second-hand smoke.\par I will have a healthier baby (If you or your partner is pregnant).\par I will have more money to spend.\par I won't have to worry: "When will I get to smoke next?"\par Questions to ask yourself\par To successfully detach from smoking, you will need to identify and address your smoking habits, the true nature of your dependency, and the techniques that work for you. These types of questions can help:\par Do you feel the need to smoke at every meal?\par Are you more of a social smoker?\par Is it a very bad addiction (more than a pack a day)? Or would a simple nicotine patch do the job?\par Is your cigarette smoking linked to other addictions, such as alcohol or gambling?\par Are you open to hypnotherapy and/or acupuncture?\par Are you someone who is open to talking about your addiction with a therapist or counselor?\par Are you interested in getting into a fitness program?\par Take the time to think of what kind of smoker you are, which moments of your life call for a cigarette, and why. This will help you to identify which tips, techniques or therapies may be most beneficial for you. \par Start your stop smoking plan with START\par S = Set a quit date.\par T = Tell family, friends, and co-workers that you plan to quit.\par A = Anticipate and plan for the challenges you'll face while quitting.\par R = Remove cigarettes and other tobacco products from your home, car, and work.\par T = Talk to your doctor about getting help to quit.\par \par After quitting, you may feel dizzy, restless, or even have strong headaches because you’re lacking the immediate release of sugar that comes from nicotine. You may also have a bigger appetite. These sugar-related cravings should only last a few days until your body adjusts so keep your sugar levels a bit higher than usual on those days by drinking plenty of juice (unless you’re a diabetic). It will help prevent the craving symptoms and help your body re-adjust back to normal.\par Tips for managing other cigarette cravings\par Cravings associated with meals\par For some smokers, ending a meal means lighting up, and the prospect of giving that up may appear daunting. TIP: replace that moment after a meal with something such as a piece of fruit, a (healthy) dessert, a square of chocolate, or a stick of gum.\par \par Alcohol and cigarettes\par Many people have a habit of smoking when they have an alcoholic drink. TIP: try non-alcoholic drinks, or try drinking only in bars, restaurant-bars, or friends’ houses where smoking inside is prohibited. Or try snacking on nuts and chips, or chewing on a straw or cocktail stick.\par \par Cravings associated with social smoking\par When friends, family, and co-workers smoke around you, it is doubly difficult to quit or avoid relapse. TIP: Your social circles need to know that you are changing your habits so talk about your decision to quit. Let them know they won’t be able to smoke when you’re in the car with them or taking a coffee break together. \par \par In your workplace, don’t take all your coffee breaks with smokers only, do something else instead, or find non-smokers to have your breaks with.\par Additional tips to deal with cravings and withdrawal symptoms\par Stay active: Keep yourself distracted and occupied, go for walks.\par Keep your hands/fingers busy: Squeeze balls, pencils, or paper clips are good substitutes to satisfy that need for tactile stimulation.\par Keep your mind busy: Read a book or magazine, listen to some music you love.\par Find an oral substitute: Keep other things around to pop in your mouth when you’re craving a cigarette.\par Good choices include mints, hard candy, carrot or celery sticks, gum, and sunflower seeds.\par Drink lots of water: Flushing toxins from your body minimizes withdrawal symptoms and helps cravings pass faster.\par Look for new ways to relax and to cope with depression or anxiety: There are a lot of ways to improve your mood without smoking. \par \par Finding the resources and support to quit smoking\par There are many different methods that have successfully helped people to quit smoking, including:\par Quitting smoking cold turkey.\par Systematically decreasing the number of cigarettes you smoke.\par Reducing your intake of nicotine gradually over time.\par Using nicotine replacement therapy or non-nicotine medications to reduce withdrawal symptoms.\par Utilizing nicotine support groups.\par Trying hypnosis, acupuncture, or counseling using cognitive behavioral techniques.\par You may be successful with the first method you try. More likely, you’ll have to try a number of different methods or a combination of treatments to find the ones that work best for you.\par \par Medication therapy\par Smoking cessation medications can ease withdrawal symptoms and reduce cravings, and are most effective when used as part of a comprehensive stop smoking program monitored by your physician. Talk to your doctor about your options and whether an anti-smoking medication is right for you. [place] Food and Drug Administration (FDA) approved options are: \par \par Nicotine Replacement Therapy\par Nicotine replacement therapy involves "replacing" cigarettes with other nicotine substitutes, such as nicotine gum or a nicotine patch. It works by delivering small and steady doses of nicotine into the body to relieve some of the withdrawal symptoms without the tars and poisonous gases found in cigarettes. This type of treatment helps smokers focus on breaking their psychological addiction and makes it easier to concentrate on learning new behaviors and coping skills.\par \par Non-Nicotine Medication\par These medications help you stop smoking by reducing cravings and withdrawal symptoms without the use of nicotine. Medications such as bupropion (Zyban) and varenicline (Chantix) are intended for short-term use only.\par \par Non-medication therapies\par There are several things you can do to stop smoking that don’t involve nicotine replacement therapy or prescription medications:\par Hypnosis\par A popular option that has produced good results. Forget anything you may have seen from stage hypnotists, hypnosis works by getting you into a deeply relaxed state where you are open to suggestions that strengthen your resolve to quit smoking and increase your negative feelings toward cigarettes. Ask your doctor to recommend a qualified smoking cessation hypnotherapist in your area or refer to the American Society of Clinical Hypnosis (ASCH) for guidelines on selecting a qualified professional.\par Acupuncture\par One of the oldest known medical techniques, acupuncture is believed to work by triggering the release of endorphins (natural pain relievers) that allow the body to relax. As a smoking cessation aid, acupuncture can be helpful in managing smoking withdrawal symptoms. Ask your doctor for a referral or search for a local practitioner at the American Association of Acupuncture and [place] [facility] (AAAOM).\par Behavioral Therapy\par Nicotine addiction is related to the habitual behaviors (the “rituals”) involved in smoking. Behavior therapy focuses on learning new coping skills and breaking those habits. The American Lung Association offers a free online smoking cessation program that focuses on behavioral change. To find a local behavioral therapist, check with your doctor or search at the Association for Behavioral and Cognitive Therapies (ABCT).\par Motivational Therapies\par Self-help books and websites can provide a number of ways to motivate yourself to quit smoking. One well known example is calculating the monetary savings. Some people have been able to find the motivation to quit just by calculating how much money they will save after they quit. It may be enough to pay for a summer vacation.\par \par [facility]\par [address]\[place] [address]\par [phone]\par [email or web address]\par \par NASAL SALINE WASHING:\par Nasal saline washing is useful during periods of sinusitis or nasal congestion (conditions which may cause excessive snoring and sleep apnea). I recommend twice daily nasal washing using Nasaline Irrigation System ™.  This may be purchased at low cost through [employer]. Nasal saline washing should be done twice daily, preferably at bedtime (to help sleeping) and after showering (since the steam from a shower helps with the process).  The basic  principles of nasal washing are as follows:\par \par 1. Mix ½ cup of table salt and ½ cup of baking soda, store this in a dry container with a lid. \par 2. Each time you need to make a saline solution, take ½ teaspoon of the dry mixture and mix it in a cup of warm water. \par 3. Using a soft rubber bulb syringe, fill the syringe with the saline solution and squeeze the bulb into one nostril and then the other. Lean over a sink and let the saline drip out. Gently blow your nose to remove mucus. Repeat the process again. \par 4. Now squeeze the bulb again into one nostril while sniffing the saline into the back of your nose and throat. Use moderate force when you squeeze the bulb. The saline will go back down your throat and may make you gag. Just clear your throat and spit it out. Do this in each nostril once or twice.  \par 5. When your nose is clear, gently blow and dry. You may now use nasal sprays if prescribed. \par 6. Clean the bulb syringe with mild bleach and water solution once a week. Be sure to rinse it well.  \par 7. If the saline solution stings or burns, change the amount of salt or baking soda in the dry mixture or the amount of water used to make the solution.

## 2019-05-08 NOTE — HISTORY OF PRESENT ILLNESS
[FreeTextEntry1] : Mr Moss was initially seen by me on 5/8/19 for second opinion regarding hypertension. He has h/o anxiety, prior contusion, GERD, nasal congestion with deviated nasal septum, nasal drip and hoarseness of voice, hyperlipidemia and former cigarette and cigar smoker, currently using E-cig. He had long h/o hypertension  managed with metoprolol succinate ER 25 mg daily and valsartan 160 mg daily. This regimen was well tolerated. However office BP was 156/88 mm Hg on 4/23/19, 145/95 mm Hg on 4/2/19, and 180/91 mm hg on 10/23/18. There were no symptoms associated with these BP readings. There was no h/o headaches, or blurred vision. He did not do self measurement of BP. He noted shortness of breath with mild exertion such as after 1.5 flights of stairs. There was no shortness of breath walking on level ground. During periods of dyspnea, there was no associated chest pain, palpitations or lightheadedness. He was a retired . He used Advil 400 mg approximately 2 – 3 times per week. He noted shortness of breath after 1.5 flights of stairs. He generally slept well, though noted intermittent loud snoring. There was no witnessed apnea. He had infrequent daytime fatigue. He denited headaches upon awakening. He consumed 3 – 4 beers every other day. He used one 18 mg E-cig daily. Previously, he smoked up to 1.5 PPD from age 24 – 40 years. He quit for 10 years and then at age 50 years resumed smoking, both cigarettes and cigars. He used Flonase for chronic nasal congestion and intermittently did nasal saline washing. Nasal endoscopy done 4/23/19 noted severe right side septal deviation. Conservative management was recommended (surgery not recommended). Family history was positive for hypertension in first degree relative (brother, father). There was no family h/o premature CAD, MI, CHF, stroke or renal failure

## 2019-05-08 NOTE — ADDENDUM
[FreeTextEntry1] : I spent 60 minutes face to face time with the patient, from 11:00 to 12:00 on 5/8/19. Thirty minutes were devoted to patient counseling as outlined above in the End of Visit section. Prior to this visit, I reviewed office medicine and ENT (otology) notes. I called and spoke with the patient by phone at 19:50 on 5/8/19 to review all findings and plans. Prescription for atorvastatin was electronically transmitted to pharmacy

## 2019-05-08 NOTE — PHYSICAL EXAM
[General Appearance - Well Developed] : well developed [Normal Appearance] : normal appearance [Well Groomed] : well groomed [No Deformities] : no deformities [General Appearance - Well Nourished] : well nourished [General Appearance - In No Acute Distress] : no acute distress [Eyelids - No Xanthelasma] : the eyelids demonstrated no xanthelasmas [Normal Conjunctiva] : the conjunctiva exhibited no abnormalities [Normal Oral Mucosa] : normal oral mucosa [No Oral Cyanosis] : no oral cyanosis [No Oral Pallor] : no oral pallor [Normal Oropharynx] : normal oropharynx [Absent] : absent [Normal Jugular Venous A Waves Present] : normal jugular venous A waves present [No Jugular Venous Yip A Waves] : no jugular venous yip A waves [Normal Jugular Venous V Waves Present] : normal jugular venous V waves present [5th Left ICS - MCL] : palpated at the 5th LICS in the midclavicular line [Normal] : normal [No Precordial Heave] : no precordial heave was noted [Normal Rate] : normal [Rhythm Regular] : regular [Normal S1] : normal S1 [Normal S2] : normal S2 [No Gallop] : no gallop heard [No Murmur] : no murmurs heard [2+] : left 2+ [No Abnormalities] : the abdominal aorta was not enlarged and no bruit was heard [No Pitting Edema] : no pitting edema present [Exaggerated Use Of Accessory Muscles For Inspiration] : no accessory muscle use [Auscultation Breath Sounds / Voice Sounds] : lungs were clear to auscultation bilaterally [Respiration, Rhythm And Depth] : normal respiratory rhythm and effort [Prolonged Exp Time] : with prolonged expiratory time [Increased E/I Ratio] : with increased expiratory/inspiratory ratio [Abdomen Tenderness] : non-tender [Abdomen Soft] : soft [Abnormal Walk] : normal gait [Abdomen Mass (___ Cm)] : no abdominal mass palpated [Gait - Sufficient For Exercise Testing] : the gait was sufficient for exercise testing [Nail Clubbing] : no clubbing of the fingernails [Cyanosis, Localized] : no localized cyanosis [Petechial Hemorrhages (___cm)] : no petechial hemorrhages [Skin Color & Pigmentation] : normal skin color and pigmentation [] : no rash [Skin Lesions] : no skin lesions [No Venous Stasis] : no venous stasis [No Xanthoma] : no  xanthoma was observed [No Skin Ulcers] : no skin ulcer [Oriented To Time, Place, And Person] : oriented to person, place, and time [No Anxiety] : not feeling anxious [Mood] : the mood was normal [Affect] : the affect was normal [Erythema] : no erythema [Macroglossia] : was not enlarged (macroglossia) [Apical Thrill] : no thrill palpable at the apex [Click] : no click [Pericardial Rub] : no pericardial rub [Right Carotid Bruit] : no bruit heard over the right carotid [Rt] : no varicose veins of the right leg [Left Carotid Bruit] : no bruit heard over the left carotid [Lt] : no varicose veins of the left leg [FreeTextEntry1] : very mild nasal rosacea noted

## 2019-05-08 NOTE — REVIEW OF SYSTEMS
[see HPI] : see HPI [Shortness Of Breath] : shortness of breath [Negative] : Endocrine [Headache] : no headache [Recent Weight Gain (___ Lbs)] : no recent weight gain [Feeling Fatigued] : not feeling fatigued [Recent Weight Loss (___ Lbs)] : no recent weight loss [Blurry Vision] : no blurred vision

## 2019-05-08 NOTE — REASON FOR VISIT
[Initial Evaluation] : an initial evaluation of [Hypertension] : hypertension [Medication Management] : Medication management

## 2019-06-18 ENCOUNTER — TRANSCRIPTION ENCOUNTER (OUTPATIENT)
Age: 59
End: 2019-06-18

## 2019-06-25 ENCOUNTER — RX RENEWAL (OUTPATIENT)
Age: 59
End: 2019-06-25

## 2019-08-08 ENCOUNTER — RX RENEWAL (OUTPATIENT)
Age: 59
End: 2019-08-08

## 2019-08-13 ENCOUNTER — MEDICATION RENEWAL (OUTPATIENT)
Age: 59
End: 2019-08-13

## 2019-08-13 ENCOUNTER — APPOINTMENT (OUTPATIENT)
Dept: FAMILY MEDICINE | Facility: CLINIC | Age: 59
End: 2019-08-13
Payer: COMMERCIAL

## 2019-08-13 VITALS
HEIGHT: 68 IN | RESPIRATION RATE: 18 BRPM | SYSTOLIC BLOOD PRESSURE: 134 MMHG | BODY MASS INDEX: 28.79 KG/M2 | WEIGHT: 190 LBS | DIASTOLIC BLOOD PRESSURE: 84 MMHG | OXYGEN SATURATION: 97 % | HEART RATE: 77 BPM

## 2019-08-13 PROCEDURE — 99213 OFFICE O/P EST LOW 20 MIN: CPT

## 2019-09-08 ENCOUNTER — RX RENEWAL (OUTPATIENT)
Age: 59
End: 2019-09-08

## 2019-10-04 ENCOUNTER — RX RENEWAL (OUTPATIENT)
Age: 59
End: 2019-10-04

## 2019-12-04 ENCOUNTER — APPOINTMENT (OUTPATIENT)
Dept: FAMILY MEDICINE | Facility: CLINIC | Age: 59
End: 2019-12-04
Payer: COMMERCIAL

## 2019-12-04 VITALS
BODY MASS INDEX: 28.79 KG/M2 | DIASTOLIC BLOOD PRESSURE: 86 MMHG | RESPIRATION RATE: 16 BRPM | OXYGEN SATURATION: 96 % | HEART RATE: 68 BPM | WEIGHT: 190 LBS | SYSTOLIC BLOOD PRESSURE: 144 MMHG | HEIGHT: 68 IN

## 2019-12-04 DIAGNOSIS — Z23 ENCOUNTER FOR IMMUNIZATION: ICD-10-CM

## 2019-12-04 DIAGNOSIS — Z92.29 PERSONAL HISTORY OF OTHER DRUG THERAPY: ICD-10-CM

## 2019-12-04 DIAGNOSIS — J98.01 ACUTE BRONCHOSPASM: ICD-10-CM

## 2019-12-04 PROCEDURE — G0008: CPT

## 2019-12-04 PROCEDURE — 99214 OFFICE O/P EST MOD 30 MIN: CPT | Mod: 25

## 2019-12-04 PROCEDURE — 90686 IIV4 VACC NO PRSV 0.5 ML IM: CPT

## 2019-12-04 RX ORDER — ATORVASTATIN CALCIUM 10 MG/1
10 TABLET, FILM COATED ORAL DAILY
Qty: 90 | Refills: 3 | Status: DISCONTINUED | COMMUNITY
Start: 2019-05-08 | End: 2019-12-04

## 2019-12-04 NOTE — ASSESSMENT
[FreeTextEntry1] : hypertension\par The patient has a diagnosis of hypertension.   The diagnosis was discussed with patient and need for medication compliance and possible side affects and risks of noncompliance. Patient was told to adhere to a low salt diet and try to incorporate exercise daily.\par \par reflux\par controlled\par \par cough\par still smoking\par ordered ct\par improved with singulair, added ventolin, advair\par \par Patient was given a vaccine and was counseled regarding all side affects. Patient was advised to ice the area if necessary if it is tender or red. Patient was told to return to office if has any fever, nausea, vomiting or increased pain.\par flu shot given\par \par \par

## 2019-12-04 NOTE — HISTORY OF PRESENT ILLNESS
[de-identified] : 59 year old male is here for a followup visit. Patient is here for medication renewals and for blood work discussion. Medications and allergies were reviewed and assessed.  There has been no new medications since the last visit. Patient is feeling well with no active changes or issues since His last visit.\par \par \par  [Cigarettes ___ packs/day] : Patient smokes [unfilled] packs of cigarettes per day [Discussed Risks and Advised to Quit Smoking] : Discussed risks and advised to quit smoking [Ready] : Patient is ready for cessation intervention [Pre-contemplation] : Pre-contemplation: patient is not planning to quit within the next 6 months [Withdrawal symptoms] : withdrawal symptoms [Quit Smoking] : Quit Smoking

## 2019-12-04 NOTE — PHYSICAL EXAM
[Well Nourished] : well nourished [Supple] : supple [Regular Rhythm] : with a regular rhythm [Clear to Auscultation] : lungs were clear to auscultation bilaterally [No CVA Tenderness] : no CVA  tenderness [Normal S1, S2] : normal S1 and S2 [Normal Gait] : normal gait [No Joint Swelling] : no joint swelling [Normal Affect] : the affect was normal

## 2019-12-04 NOTE — HEALTH RISK ASSESSMENT
[] : No [21-25] : 21-25 [No falls in past year] : Patient reported no falls in the past year [[id number]] : 0 [0] : 2) Feeling down, depressed, or hopeless: Not at all (0) [Good] : ~his/her~  mood as  good [Patient reported colonoscopy was normal] : Patient reported colonoscopy was normal [HIV Test offered] : HIV Test offered [Hepatitis C test offered] : Hepatitis C test offered [With Significant Other] : lives with significant other [Employed] : employed [] :  [# Of Children ___] : has [unfilled] children [ColonoscopyDate] : 2011 [Fully functional (bathing, dressing, toileting, transferring, walking, feeding)] : Fully functional (bathing, dressing, toileting, transferring, walking, feeding) [AdvancecareDate] : 04/02/2019 [With Patient/Caregiver] : With Patient/Caregiver [de-identified] : retired  [FreeTextEntry4] : none

## 2020-02-04 ENCOUNTER — APPOINTMENT (OUTPATIENT)
Dept: OTOLARYNGOLOGY | Facility: CLINIC | Age: 60
End: 2020-02-04
Payer: COMMERCIAL

## 2020-02-04 VITALS
BODY MASS INDEX: 28.79 KG/M2 | DIASTOLIC BLOOD PRESSURE: 83 MMHG | HEART RATE: 73 BPM | WEIGHT: 190 LBS | HEIGHT: 68 IN | SYSTOLIC BLOOD PRESSURE: 169 MMHG

## 2020-02-04 PROCEDURE — 69210 REMOVE IMPACTED EAR WAX UNI: CPT

## 2020-02-04 PROCEDURE — 31231 NASAL ENDOSCOPY DX: CPT

## 2020-02-04 PROCEDURE — 99214 OFFICE O/P EST MOD 30 MIN: CPT | Mod: 25

## 2020-02-04 NOTE — HISTORY OF PRESENT ILLNESS
[de-identified] : 58 y/o with 3 year history of dysphonia related to reflux and muscle tension. Still with hoarseness but no worsening or improvement since last visit in April 2019. On Omeprazole 3-4 times a week.\par No throat pain or difficulty swallowing. \par \par Also with longstanding history of bilat. nasal congestion. On Flonase daily with improvement in nasal congestion. \par No runny nose, facial pain, sinus pressure or PND.

## 2020-02-04 NOTE — ASSESSMENT
[FreeTextEntry1] : Pt with dysphonia likely due to LPR and muscle tension, no nodules, with improvement on reflux precautions and PPI but not resolved.\par - Continue lifestyle regimen to reduce overproduction of acid and reduce laryngeal reflux including avoiding caffein, alcohol, eating before bed, spicy and fatty foods, and head elevation at night etc. Handout detailing regiment also given\par - Continue PPI- will taper off\par - Will do regular follow up with GI, states had EGD 4-5 years ago \par - F/up in 6 months\par \par Pt with nasal congestion with right septal deviation and bilateral turbinate hypertrophy. Will start regimen to see if may improve symptoms and escalate if needed \par - Continue Flonase and NSS\par \par Bilat. CI\par - Cleared\par - Ear hygiene\par - Discussed preventive measures and signs of accumulation\par \par \par

## 2020-02-04 NOTE — PHYSICAL EXAM
[Midline] : trachea located in midline position [Normal] : no rashes [de-identified] : AU: mild CI

## 2020-02-04 NOTE — END OF VISIT
[FreeTextEntry3] : I personally saw and examined DESTINY JACKMAN in detail. I spoke to KIMBERLEY Sanders regarding the assessment and plan of care.  I preformed the procedures and I reviewed the above assessment and plan of care, and agree. I have made changes in changes in the body of the note where appropriate.\par \par

## 2020-02-04 NOTE — PROCEDURE
[FreeTextEntry6] : Procedure performed: Nasal Endoscopy- Diagnostic\par Pre / post -procedure indication(s): nasal congestion\par Verbal and/or written consent obtained from patient\par Scope #:9,  flexible fiber optic telescope used\par The scope was introduced in the nasal passage between the middle and inferior turbinates to exam the inferior portion of the middle meatus and the fontanelle, as well as the maxillary ostia.  Next, the scope was passed medically and posteriorly to the middle turbinates to examine the sphenoethmoid recess and the superior turbinate region.\par Upon visualization the finders are as follows:\par Nasal Septum: right side septal deviation \par B/L: Mucosa: mucosal erythema and edema, Mucous: scant, Polyp: not seen, Inferior Turbinate, Middle and Superior Turbinate: turbinate hypertrophy Inferior Meatus: narrow, Middle Meatus: narrow, Super Meatus:normal, Sphenoethmoidal Recess: clear.  Eustachian tube clear. \par The patient tolerated the procedure well\par  [FreeTextEntry3] : Procedure - Cerumen Removal. \par After informed verbal consent is obtained, cerumen is removed from the left ear canal with suction.  Normal appearing canal following removal.\par  [de-identified] : Procedure performed: laryngeal Endoscopy- Diagnostic\par Pre-op/post op indication: globus\par Verbal and/or written consent obtained from patient\par Scope #: 9 flexible fiber optic telescope used.\par Scope was introduced through the nose passed on the floor of the nose to the nasopharynx and then followed down the soft palate to the lower pharynx. The tongue Base, Larynx, Hypopharynx were examined. Base of tongue was symmetric, vallecular was clear, epiglottis was not deformed, Glottis with fully mobile vocal cords without lesions or masses. Visualized subglottis clear. Postcricoid area with erythema and edema. + Mild reflux changes No intra-arytenoid bar. No pooling of secretions, masses or lesions. Airway patent, no foreign body visualized. No glottic/supraglottic edema. True vocal cords, arytenoids, vestibular folds, ventricles, pyriform sinuses, and aryepiglottic folds appear normal bilaterally. Vocal cords mobile with good contact b/l.\par

## 2020-02-04 NOTE — CONSULT LETTER
[FreeTextEntry1] : Dear Dr. CEDRIC BLAIR \par I had the pleasure of evaluating your patient DESTINY JACKMAN, thank you for allowing us to participate in their care. please see full note detailing our visit below.\par If you have any questions, please do not hesitate to call me and I would be happy to discuss further. \par \par Jack Woodall M.D.\par Attending Physician,  \par Department of Otolaryngology - Head and Neck Surgery\par [facility] \par Office: [phone]\par Fax: [phone]\par \par

## 2020-06-08 ENCOUNTER — APPOINTMENT (OUTPATIENT)
Dept: FAMILY MEDICINE | Facility: CLINIC | Age: 60
End: 2020-06-08
Payer: COMMERCIAL

## 2020-06-08 ENCOUNTER — LABORATORY RESULT (OUTPATIENT)
Age: 60
End: 2020-06-08

## 2020-06-08 VITALS
HEIGHT: 68 IN | SYSTOLIC BLOOD PRESSURE: 138 MMHG | HEART RATE: 61 BPM | WEIGHT: 193 LBS | BODY MASS INDEX: 29.25 KG/M2 | DIASTOLIC BLOOD PRESSURE: 80 MMHG | OXYGEN SATURATION: 98 %

## 2020-06-08 PROCEDURE — 99396 PREV VISIT EST AGE 40-64: CPT | Mod: 25

## 2020-06-08 PROCEDURE — 36415 COLL VENOUS BLD VENIPUNCTURE: CPT

## 2020-06-08 RX ORDER — AMOXICILLIN 500 MG/1
500 CAPSULE ORAL
Qty: 21 | Refills: 0 | Status: COMPLETED | COMMUNITY
Start: 2020-03-24

## 2020-06-08 NOTE — HEALTH RISK ASSESSMENT
[Very Good] : ~his/her~  mood as very good [] : No [21-25] : 21-25 [Yes] : Yes [No falls in past year] : Patient reported no falls in the past year [0] : 2) Feeling down, depressed, or hopeless: Not at all (0) [[id number]] : 0 [Patient reported colonoscopy was normal] : Patient reported colonoscopy was normal [HIV Test offered] : HIV Test offered [Hepatitis C test offered] : Hepatitis C test offered [Employed] : employed [With Significant Other] : lives with significant other [] :  [# Of Children ___] : has [unfilled] children [Fully functional (bathing, dressing, toileting, transferring, walking, feeding)] : Fully functional (bathing, dressing, toileting, transferring, walking, feeding) [Fully functional (using the telephone, shopping, preparing meals, housekeeping, doing laundry, using] : Fully functional and needs no help or supervision to perform IADLs (using the telephone, shopping, preparing meals, housekeeping, doing laundry, using transportation, managing medications and managing finances) [ColonoscopyDate] : 2011 [de-identified] : retired  [With Patient/Caregiver] : With Patient/Caregiver [AdvancecareDate] : 04/02/2019 [FreeTextEntry4] : none

## 2020-06-08 NOTE — PHYSICAL EXAM
[Well Nourished] : well nourished [Well Developed] : well developed [Clear to Auscultation] : lungs were clear to auscultation bilaterally [Regular Rhythm] : with a regular rhythm [Normal S1, S2] : normal S1 and S2 [Normal Gait] : normal gait [Normal Affect] : the affect was normal [Normal Insight/Judgement] : insight and judgment were intact

## 2020-06-08 NOTE — HISTORY OF PRESENT ILLNESS
[de-identified] : 60 year old male  here for annual well visit. Patient's blood work was drawn and medications reviewed. Patient's past medical history was reviewed, allergies verified and problems were identified and assessed. Patients medications were reviewed. Patient is feeling well with no new or active complaints at this time.\par \par \par  [Cigarettes ___ packs/day] : Patient smokes [unfilled] packs of cigarettes per day [Discussed Risks and Advised to Quit Smoking] : Discussed risks and advised to quit smoking [Ready] : Patient is ready for cessation intervention [Pre-contemplation] : Pre-contemplation: patient is not planning to quit within the next 6 months [Withdrawal symptoms] : withdrawal symptoms [Quit Smoking] : Quit Smoking

## 2020-06-08 NOTE — ASSESSMENT
[FreeTextEntry1] : hypertension\par The patient has a diagnosis of hypertension. Blood work was drawn in office and will be followed.  The diagnosis was discussed with patient and need for medication compliance and possible side affects and risks of noncompliance. Patient was told to adhere to a low salt diet and try to incorporate exercise daily.\par \par reflux\par controlled\par \par cough\par still smoking\par improved with singulair, added ventolin, advair - wans't taking daily, will start and reassess\par \par Patient was counseled on healthy eating habits, on daily exercise and stress relief. All medications and allergies were reviewed with the patient and any adjustments necessary were made. Patient was counseled to try engage in an exercise activity for at least 30 minutes 3-4 times a week.  Patient was advised to eat a diet low in fat and carbohydrates and high in protein, with plenty of vegetables. Patient was advised to try and engage in relaxing activities whenever possible.\par The patients blood was draw in office and will be followed and assessed for any issues.  Patient was told to return to the office if any issues arise.  Unless otherwise stated, the patient is to continue all other medications as previously prescribed.\par \par \par \par

## 2020-06-09 LAB
APPEARANCE: CLEAR
BASOPHILS # BLD AUTO: 0.07 K/UL
BASOPHILS NFR BLD AUTO: 1.4 %
BILIRUBIN URINE: NEGATIVE
BLOOD URINE: NEGATIVE
CHOLEST SERPL-MCNC: 196 MG/DL
CHOLEST/HDLC SERPL: 4 RATIO
COLOR: COLORLESS
EOSINOPHIL # BLD AUTO: 0.14 K/UL
EOSINOPHIL NFR BLD AUTO: 2.7 %
ESTIMATED AVERAGE GLUCOSE: 108 MG/DL
GLUCOSE QUALITATIVE U: NEGATIVE
HBA1C MFR BLD HPLC: 5.4 %
HCT VFR BLD CALC: 45.5 %
HDLC SERPL-MCNC: 49 MG/DL
HGB BLD-MCNC: 15 G/DL
IMM GRANULOCYTES NFR BLD AUTO: 0.6 %
KETONES URINE: NEGATIVE
LDLC SERPL CALC-MCNC: 128 MG/DL
LEUKOCYTE ESTERASE URINE: NEGATIVE
LYMPHOCYTES # BLD AUTO: 1.45 K/UL
LYMPHOCYTES NFR BLD AUTO: 28 %
MAN DIFF?: NORMAL
MCHC RBC-ENTMCNC: 33 GM/DL
MCHC RBC-ENTMCNC: 34.6 PG
MCV RBC AUTO: 105.1 FL
MONOCYTES # BLD AUTO: 0.56 K/UL
MONOCYTES NFR BLD AUTO: 10.8 %
NEUTROPHILS # BLD AUTO: 2.92 K/UL
NEUTROPHILS NFR BLD AUTO: 56.5 %
NITRITE URINE: NEGATIVE
PH URINE: 6.5
PLATELET # BLD AUTO: 220 K/UL
PROTEIN URINE: NEGATIVE
PSA SERPL-MCNC: 0.4 NG/ML
RBC # BLD: 4.33 M/UL
RBC # FLD: 11.8 %
SARS-COV-2 IGG SERPL IA-ACNC: <0.1 INDEX
SARS-COV-2 IGG SERPL QL IA: NEGATIVE
SPECIFIC GRAVITY URINE: 1.01
TRIGL SERPL-MCNC: 97 MG/DL
TSH SERPL-ACNC: 0.98 UIU/ML
UROBILINOGEN URINE: NORMAL
WBC # FLD AUTO: 5.17 K/UL

## 2020-08-17 ENCOUNTER — APPOINTMENT (OUTPATIENT)
Dept: OTOLARYNGOLOGY | Facility: CLINIC | Age: 60
End: 2020-08-17
Payer: COMMERCIAL

## 2020-08-17 VITALS
DIASTOLIC BLOOD PRESSURE: 89 MMHG | WEIGHT: 190 LBS | HEIGHT: 68 IN | BODY MASS INDEX: 28.79 KG/M2 | TEMPERATURE: 97.9 F | HEART RATE: 74 BPM | SYSTOLIC BLOOD PRESSURE: 158 MMHG

## 2020-08-17 DIAGNOSIS — R49.0 DYSPHONIA: ICD-10-CM

## 2020-08-17 PROCEDURE — 99214 OFFICE O/P EST MOD 30 MIN: CPT | Mod: 25

## 2020-08-17 PROCEDURE — 31231 NASAL ENDOSCOPY DX: CPT

## 2020-08-17 NOTE — CONSULT LETTER
[Please see my note below.] : Please see my note below. [FreeTextEntry1] : Dear Dr. CEDRIC BLAIR \par I had the pleasure of evaluating your patient DESTINY JACKMAN, thank you for allowing us to participate in their care. please see full note detailing our visit below.\par If you have any questions, please do not hesitate to call me and I would be happy to discuss further. \par \par Jack Woodall M.D.\par Attending Physician,  \par Department of Otolaryngology - Head and Neck Surgery\par [facility] \par Office: [phone]\par Fax: [phone]\par \par

## 2020-08-17 NOTE — PROCEDURE
[de-identified] : Procedure performed: laryngeal Endoscopy- Diagnostic\par Pre-op/post op indication: globus\par Verbal and/or written consent obtained from patient\par Scope #: 9 flexible fiber optic telescope used.\par Scope was introduced through the nose passed on the floor of the nose to the nasopharynx and then followed down the soft palate to the lower pharynx. The tongue Base, Larynx, Hypopharynx were examined. Base of tongue was symmetric, vallecular was clear, epiglottis was not deformed, Glottis with fully mobile vocal cords without lesions or masses. Visualized subglottis clear. Postcricoid area with erythema and edema. + Mild reflux changes No intra-arytenoid bar. No pooling of secretions, masses or lesions. Airway patent, no foreign body visualized. No glottic/supraglottic edema. True vocal cords, arytenoids, vestibular folds, ventricles, pyriform sinuses, and aryepiglottic folds appear normal bilaterally. Vocal cords mobile with good contact b/l.\par  [FreeTextEntry6] : Procedure performed: Nasal Endoscopy- Diagnostic\par Pre / post -procedure indication(s): nasal congestion\par Verbal and/or written consent obtained from patient\par Scope #:9,  flexible fiber optic telescope used\par The scope was introduced in the nasal passage between the middle and inferior turbinates to exam the inferior portion of the middle meatus and the fontanelle, as well as the maxillary ostia.  Next, the scope was passed medically and posteriorly to the middle turbinates to examine the sphenoethmoid recess and the superior turbinate region.\par Upon visualization the finders are as follows:\par Nasal Septum: right side septal deviation \par B/L: Mucosa: mucosal erythema and edema, Mucous: scant, Polyp: not seen, Inferior Turbinate, Middle and Superior Turbinate: turbinate hypertrophy Inferior Meatus: narrow, Middle Meatus: narrow, Super Meatus:normal, Sphenoethmoidal Recess: clear.  Eustachian tube clear. \par The patient tolerated the procedure well\par

## 2020-08-17 NOTE — HISTORY OF PRESENT ILLNESS
[de-identified] : 60 y/o with 3 year history of dysphonia related to reflux and muscle tension. Still with hoarseness but no worsening or improvement since last visit in April 2019. On Omeprazole 3-4 times a week.\par No throat pain or difficulty swallowing. \par \par Also with longstanding history of bilat. nasal congestion. On Flonase daily with improvement in nasal congestion. \par No runny nose, facial pain, sinus pressure or PND. [FreeTextEntry1] : Pt states he is doing the same since the last time he saw us. \par still taking medication for acid reflux- is getting better, feels its under control \par pt denies diff swallowing, eating, no diff breathing, not a smoker anymore- was smoker for 20 years \par \par pt is still using nasal congestion, using flonase every other day, or as needed with relief \par pt denies sinus pressure no sinus infections

## 2020-08-17 NOTE — END OF VISIT
[FreeTextEntry3] : I personally saw and examined DESTINY JACKMAN in detail. I spoke to KIMBERLEY Miller regarding the assessment and plan of care.  I preformed the procedures and I reviewed the above assessment and plan of care, and agree. I have made changes in changes in the body of the note where appropriate.\par \par

## 2020-08-31 ENCOUNTER — RX RENEWAL (OUTPATIENT)
Age: 60
End: 2020-08-31

## 2020-10-12 ENCOUNTER — TRANSCRIPTION ENCOUNTER (OUTPATIENT)
Age: 60
End: 2020-10-12

## 2020-10-22 ENCOUNTER — TRANSCRIPTION ENCOUNTER (OUTPATIENT)
Age: 60
End: 2020-10-22

## 2020-10-26 ENCOUNTER — APPOINTMENT (OUTPATIENT)
Dept: FAMILY MEDICINE | Facility: CLINIC | Age: 60
End: 2020-10-26
Payer: COMMERCIAL

## 2020-10-26 VITALS
DIASTOLIC BLOOD PRESSURE: 80 MMHG | BODY MASS INDEX: 29.65 KG/M2 | WEIGHT: 195 LBS | HEART RATE: 70 BPM | SYSTOLIC BLOOD PRESSURE: 130 MMHG | OXYGEN SATURATION: 98 %

## 2020-10-26 DIAGNOSIS — Z23 ENCOUNTER FOR IMMUNIZATION: ICD-10-CM

## 2020-10-26 PROCEDURE — 99214 OFFICE O/P EST MOD 30 MIN: CPT | Mod: 25

## 2020-10-26 PROCEDURE — 90471 IMMUNIZATION ADMIN: CPT

## 2020-10-26 PROCEDURE — 99072 ADDL SUPL MATRL&STAF TM PHE: CPT

## 2020-10-26 PROCEDURE — 90715 TDAP VACCINE 7 YRS/> IM: CPT

## 2020-10-26 NOTE — HEALTH RISK ASSESSMENT
[] : No [21-25] : 21-25 [Yes] : Yes [No falls in past year] : Patient reported no falls in the past year [0] : 2) Feeling down, depressed, or hopeless: Not at all (0) [[id number]] : 0 [Very Good] : ~his/her~  mood as very good [Patient reported colonoscopy was normal] : Patient reported colonoscopy was normal [HIV Test offered] : HIV Test offered [Hepatitis C test offered] : Hepatitis C test offered [With Significant Other] : lives with significant other [Employed] : employed [] :  [# Of Children ___] : has [unfilled] children [Fully functional (bathing, dressing, toileting, transferring, walking, feeding)] : Fully functional (bathing, dressing, toileting, transferring, walking, feeding) [Fully functional (using the telephone, shopping, preparing meals, housekeeping, doing laundry, using] : Fully functional and needs no help or supervision to perform IADLs (using the telephone, shopping, preparing meals, housekeeping, doing laundry, using transportation, managing medications and managing finances) [ColonoscopyDate] : 2011 [de-identified] : retired  [With Patient/Caregiver] : With Patient/Caregiver [AdvancecareDate] : 04/02/2019 [FreeTextEntry4] : none

## 2020-10-26 NOTE — HISTORY OF PRESENT ILLNESS
[de-identified] : 60 year old male is here for a followup visit. Patient is here for medication renewals and for blood work discussion. Medications and allergies were reviewed and assessed.  There has been no new medications since the last visit. Patient is feeling well with no active changes or issues since His last visit.\par \par \par  [Cigarettes ___ packs/day] : Patient smokes [unfilled] packs of cigarettes per day [Discussed Risks and Advised to Quit Smoking] : Discussed risks and advised to quit smoking [Ready] : Patient is ready for cessation intervention [Pre-contemplation] : Pre-contemplation: patient is not planning to quit within the next 6 months [Withdrawal symptoms] : withdrawal symptoms [Quit Smoking] : Quit Smoking

## 2020-10-26 NOTE — ASSESSMENT
[FreeTextEntry1] : hypertension\par The patient has a diagnosis of hypertension.The diagnosis was discussed with patient and need for medication compliance and possible side affects and risks of noncompliance. Patient was told to adhere to a low salt diet and try to incorporate exercise daily.\par \par reflux\par controlled\par \par cough\par still smoking\par improved with singulair, added ventolin, advair - wans't taking daily, will start and reassess\par \par tdap given\par \par \par \par

## 2020-12-16 PROBLEM — J06.9 VIRAL URI WITH COUGH: Status: RESOLVED | Noted: 2018-04-17 | Resolved: 2020-12-16

## 2020-12-21 ENCOUNTER — TRANSCRIPTION ENCOUNTER (OUTPATIENT)
Age: 60
End: 2020-12-21

## 2021-01-04 ENCOUNTER — TRANSCRIPTION ENCOUNTER (OUTPATIENT)
Age: 61
End: 2021-01-04

## 2021-02-10 ENCOUNTER — RX RENEWAL (OUTPATIENT)
Age: 61
End: 2021-02-10

## 2021-06-28 ENCOUNTER — APPOINTMENT (OUTPATIENT)
Dept: FAMILY MEDICINE | Facility: CLINIC | Age: 61
End: 2021-06-28
Payer: COMMERCIAL

## 2021-06-28 VITALS
SYSTOLIC BLOOD PRESSURE: 128 MMHG | WEIGHT: 189 LBS | RESPIRATION RATE: 16 BRPM | TEMPERATURE: 97.1 F | DIASTOLIC BLOOD PRESSURE: 76 MMHG | OXYGEN SATURATION: 98 % | BODY MASS INDEX: 28.64 KG/M2 | HEIGHT: 68 IN | HEART RATE: 70 BPM

## 2021-06-28 PROCEDURE — 36415 COLL VENOUS BLD VENIPUNCTURE: CPT

## 2021-06-28 PROCEDURE — 99396 PREV VISIT EST AGE 40-64: CPT | Mod: 25

## 2021-06-28 PROCEDURE — 99072 ADDL SUPL MATRL&STAF TM PHE: CPT

## 2021-06-29 LAB
25(OH)D3 SERPL-MCNC: 43.6 NG/ML
ALBUMIN SERPL ELPH-MCNC: 4.4 G/DL
ALP BLD-CCNC: 93 U/L
ALT SERPL-CCNC: 23 U/L
ANION GAP SERPL CALC-SCNC: 11 MMOL/L
APPEARANCE: CLEAR
AST SERPL-CCNC: 22 U/L
BACTERIA: NEGATIVE
BASOPHILS # BLD AUTO: 0.06 K/UL
BASOPHILS NFR BLD AUTO: 1.1 %
BILIRUB SERPL-MCNC: 0.3 MG/DL
BILIRUBIN URINE: NEGATIVE
BLOOD URINE: NEGATIVE
BUN SERPL-MCNC: 12 MG/DL
CALCIUM SERPL-MCNC: 9.2 MG/DL
CHLORIDE SERPL-SCNC: 103 MMOL/L
CHOLEST SERPL-MCNC: 176 MG/DL
CO2 SERPL-SCNC: 25 MMOL/L
COLOR: NORMAL
CREAT SERPL-MCNC: 1 MG/DL
EOSINOPHIL # BLD AUTO: 0.19 K/UL
EOSINOPHIL NFR BLD AUTO: 3.5 %
ESTIMATED AVERAGE GLUCOSE: 111 MG/DL
GLUCOSE QUALITATIVE U: NEGATIVE
GLUCOSE SERPL-MCNC: 101 MG/DL
HBA1C MFR BLD HPLC: 5.5 %
HCT VFR BLD CALC: 44.1 %
HDLC SERPL-MCNC: 43 MG/DL
HGB BLD-MCNC: 14.7 G/DL
HYALINE CASTS: 0 /LPF
IMM GRANULOCYTES NFR BLD AUTO: 0.5 %
KETONES URINE: NEGATIVE
LDLC SERPL CALC-MCNC: 106 MG/DL
LEUKOCYTE ESTERASE URINE: NEGATIVE
LYMPHOCYTES # BLD AUTO: 1.58 K/UL
LYMPHOCYTES NFR BLD AUTO: 28.7 %
MAN DIFF?: NORMAL
MCHC RBC-ENTMCNC: 33.3 GM/DL
MCHC RBC-ENTMCNC: 33.6 PG
MCV RBC AUTO: 100.9 FL
MICROSCOPIC-UA: NORMAL
MONOCYTES # BLD AUTO: 0.65 K/UL
MONOCYTES NFR BLD AUTO: 11.8 %
NEUTROPHILS # BLD AUTO: 2.99 K/UL
NEUTROPHILS NFR BLD AUTO: 54.4 %
NITRITE URINE: NEGATIVE
NONHDLC SERPL-MCNC: 133 MG/DL
PH URINE: 6
PLATELET # BLD AUTO: 223 K/UL
POTASSIUM SERPL-SCNC: 4.1 MMOL/L
PROT SERPL-MCNC: 6.6 G/DL
PROTEIN URINE: NEGATIVE
PSA SERPL-MCNC: 0.57 NG/ML
RBC # BLD: 4.37 M/UL
RBC # FLD: 11.6 %
RED BLOOD CELLS URINE: 0 /HPF
SODIUM SERPL-SCNC: 139 MMOL/L
SPECIFIC GRAVITY URINE: 1.01
SQUAMOUS EPITHELIAL CELLS: 1 /HPF
TRIGL SERPL-MCNC: 135 MG/DL
TSH SERPL-ACNC: 0.99 UIU/ML
UROBILINOGEN URINE: NORMAL
WBC # FLD AUTO: 5.5 K/UL
WHITE BLOOD CELLS URINE: 1 /HPF

## 2021-11-18 ENCOUNTER — APPOINTMENT (OUTPATIENT)
Dept: OTOLARYNGOLOGY | Facility: CLINIC | Age: 61
End: 2021-11-18
Payer: COMMERCIAL

## 2021-11-18 VITALS — SYSTOLIC BLOOD PRESSURE: 148 MMHG | DIASTOLIC BLOOD PRESSURE: 80 MMHG | TEMPERATURE: 98 F | HEART RATE: 65 BPM

## 2021-11-18 DIAGNOSIS — H61.22 IMPACTED CERUMEN, LEFT EAR: ICD-10-CM

## 2021-11-18 PROCEDURE — 31231 NASAL ENDOSCOPY DX: CPT

## 2021-11-18 PROCEDURE — 69210 REMOVE IMPACTED EAR WAX UNI: CPT

## 2021-11-18 PROCEDURE — 99214 OFFICE O/P EST MOD 30 MIN: CPT | Mod: 25

## 2021-11-18 NOTE — PROCEDURE
[FreeTextEntry6] : Procedure performed: Nasal Endoscopy- Diagnostic\par Pre / post -procedure indication(s): nasal congestion\par Verbal and/or written consent obtained from patient\par Scope #:9,  flexible fiber optic telescope used\par The scope was introduced in the nasal passage between the middle and inferior turbinates to exam the inferior portion of the middle meatus and the fontanelle, as well as the maxillary ostia.  Next, the scope was passed medically and posteriorly to the middle turbinates to examine the sphenoethmoid recess and the superior turbinate region.\par Upon visualization the finders are as follows:\par Nasal Septum: right side septal deviation \par B/L: Mucosa: mucosal erythema and edema, Mucous: scant, Polyp: not seen, Inferior Turbinate, Middle and Superior Turbinate: turbinate hypertrophy Inferior Meatus: narrow, Middle Meatus: narrow, Super Meatus:normal, Sphenoethmoidal Recess: clear.  Eustachian tube clear. \par The patient tolerated the procedure well\par  [de-identified] : Procedure performed: laryngeal Endoscopy- Diagnostic\par Pre-op/post op indication: globus\par Verbal and/or written consent obtained from patient\par Scope #: 9 flexible fiber optic telescope used.\par Scope was introduced through the nose passed on the floor of the nose to the nasopharynx and then followed down the soft palate to the lower pharynx. The tongue Base, Larynx, Hypopharynx were examined. Base of tongue was symmetric, vallecular was clear, epiglottis was not deformed, Glottis with fully mobile vocal cords without lesions or masses. Visualized subglottis clear. Postcricoid area with erythema and edema. + Mild reflux changes No intra-arytenoid bar. No pooling of secretions, masses or lesions. Airway patent, no foreign body visualized. No glottic/supraglottic edema. True vocal cords, arytenoids, vestibular folds, ventricles, pyriform sinuses, and aryepiglottic folds appear normal bilaterally. Vocal cords mobile with good contact b/l.\par

## 2021-11-18 NOTE — ASSESSMENT
[FreeTextEntry1] : Pt with dysphonia likely due to LPR and muscle tension, no nodules, with improvement on reflux precautions and PPI but not resolved.\par - Continue lifestyle regimen to reduce overproduction of acid and reduce laryngeal reflux including avoiding caffein, alcohol, eating before bed, spicy and fatty foods, and head elevation at night etc. Handout detailing regiment also given\par - Continue PPI- will taper off\par - Will do regular follow up with GI, states had EGD 4-5 years ago \par \par \par Pt with nasal congestion with right septal deviation and bilateral turbinate hypertrophy. Will start regimen to see if may improve symptoms and escalate if needed \par - Continue Flonase and NSS\par \par Bilat. CI\par - Cleared\par - Ear hygiene\par - Discussed preventive measures and signs of accumulation\par \par dry lips and mouth - biotene\par Vaseline \par

## 2021-11-18 NOTE — CONSULT LETTER
[Please see my note below.] : Please see my note below. [FreeTextEntry1] : Dear Dr. CEDRIC BLAIR \par I had the pleasure of evaluating your patient DESTINY JACKMAN, thank you for allowing us to participate in their care. please see full note detailing our visit below.\par If you have any questions, please do not hesitate to call me and I would be happy to discuss further. \par \par Jack Woodall M.D.\par Attending Physician,  \par Department of Otolaryngology - Head and Neck Surgery\par [facility] \par Office: [phone]\par Fax: [phone]\par \par

## 2021-11-18 NOTE — HISTORY OF PRESENT ILLNESS
[de-identified] : 58 y/o with 3 year history of dysphonia related to reflux and muscle tension. Still with hoarseness but no worsening or improvement since last visit in April 2019. On Omeprazole 3-4 times a week.\par No throat pain or difficulty swallowing. \par \par Also with longstanding history of bilat. nasal congestion. On Flonase daily with improvement in nasal congestion. \par No runny nose, facial pain, sinus pressure or PND. [FreeTextEntry1] : Pt states he is doing the same since the last time he saw us. \par still taking medication for acid reflux- is getting better, feels its under control \par pt denies diff swallowing, eating, no diff breathing, not a smoker anymore- was smoker for 20 years \par \par pt is still with occ nasal congestion, using flonase every other day, or as needed with relief \par pt denies sinus pressure no sinus infections \par \par feels lips and tongue is dry, but also feels they are not chapped and feel moist to touch

## 2021-12-06 ENCOUNTER — APPOINTMENT (OUTPATIENT)
Dept: FAMILY MEDICINE | Facility: CLINIC | Age: 61
End: 2021-12-06
Payer: COMMERCIAL

## 2021-12-06 VITALS
BODY MASS INDEX: 28.49 KG/M2 | HEART RATE: 72 BPM | DIASTOLIC BLOOD PRESSURE: 84 MMHG | OXYGEN SATURATION: 98 % | SYSTOLIC BLOOD PRESSURE: 134 MMHG | WEIGHT: 188 LBS | TEMPERATURE: 98.1 F | HEIGHT: 68 IN

## 2021-12-06 PROCEDURE — G0008: CPT

## 2021-12-06 PROCEDURE — 99214 OFFICE O/P EST MOD 30 MIN: CPT | Mod: 25

## 2021-12-06 PROCEDURE — 90686 IIV4 VACC NO PRSV 0.5 ML IM: CPT

## 2021-12-27 ENCOUNTER — APPOINTMENT (OUTPATIENT)
Dept: FAMILY MEDICINE | Facility: CLINIC | Age: 61
End: 2021-12-27
Payer: COMMERCIAL

## 2021-12-27 DIAGNOSIS — G62.9 POLYNEUROPATHY, UNSPECIFIED: ICD-10-CM

## 2021-12-27 DIAGNOSIS — R43.8 OTHER DISTURBANCES OF SMELL AND TASTE: ICD-10-CM

## 2021-12-27 PROCEDURE — 99213 OFFICE O/P EST LOW 20 MIN: CPT | Mod: 95

## 2022-01-10 ENCOUNTER — TRANSCRIPTION ENCOUNTER (OUTPATIENT)
Age: 62
End: 2022-01-10

## 2022-05-02 ENCOUNTER — RX RENEWAL (OUTPATIENT)
Age: 62
End: 2022-05-02

## 2022-07-10 ENCOUNTER — RX RENEWAL (OUTPATIENT)
Age: 62
End: 2022-07-10

## 2022-07-11 ENCOUNTER — RX RENEWAL (OUTPATIENT)
Age: 62
End: 2022-07-11

## 2022-07-12 ENCOUNTER — APPOINTMENT (OUTPATIENT)
Dept: OTOLARYNGOLOGY | Facility: CLINIC | Age: 62
End: 2022-07-12

## 2022-07-12 VITALS
DIASTOLIC BLOOD PRESSURE: 88 MMHG | BODY MASS INDEX: 28.49 KG/M2 | WEIGHT: 188 LBS | HEART RATE: 64 BPM | HEIGHT: 68 IN | SYSTOLIC BLOOD PRESSURE: 157 MMHG | TEMPERATURE: 98.2 F

## 2022-07-12 DIAGNOSIS — J04.0 ACUTE LARYNGITIS: ICD-10-CM

## 2022-07-12 DIAGNOSIS — H61.23 IMPACTED CERUMEN, BILATERAL: ICD-10-CM

## 2022-07-12 DIAGNOSIS — B97.89 ACUTE LARYNGITIS: ICD-10-CM

## 2022-07-12 DIAGNOSIS — R68.2 DRY MOUTH, UNSPECIFIED: ICD-10-CM

## 2022-07-12 PROCEDURE — 69210 REMOVE IMPACTED EAR WAX UNI: CPT

## 2022-07-12 PROCEDURE — 99214 OFFICE O/P EST MOD 30 MIN: CPT | Mod: 25

## 2022-07-12 PROCEDURE — 31231 NASAL ENDOSCOPY DX: CPT

## 2022-07-12 RX ORDER — CLOBETASOL PROPIONATE 0.5 MG/G
0.05 CREAM TOPICAL
Qty: 60 | Refills: 0 | Status: ACTIVE | COMMUNITY
Start: 2022-04-18

## 2022-07-12 NOTE — CONSULT LETTER
[Please see my note below.] : Please see my note below. [FreeTextEntry1] : Dear Dr. CEDRIC BLAIR \par I had the pleasure of evaluating your patient DESTINY JACKMAN, thank you for allowing us to participate in their care. please see full note detailing our visit below.\par If you have any questions, please do not hesitate to call me and I would be happy to discuss further. \par \par Jack Woodall M.D.\par Attending Physician,  \par Department of Otolaryngology - Head and Neck Surgery\par [facility] \par Office: [phone]\par Fax: [phone]\par \par

## 2022-07-12 NOTE — ASSESSMENT
[FreeTextEntry1] : Pt with dysphonia likely due to LPR and muscle tension, no nodules, with improvement on reflux precautions but with viral laryngitis with small exudates.\par - Continue lifestyle regimen to reduce overproduction of acid and reduce laryngeal reflux including avoiding caffein, alcohol, eating before bed, spicy and fatty foods, and head elevation at night etc. Handout detailing regiment also given\par - Continue PPI- continue taper off\par - Start Pepcid at night fornext two week\par reflux precautions\par supportive care\par will let us know if zuniga snot continue to improve \par - Will do regular follow up with GI, states had EGD 4-5 years ago \par \par \par Pt with nasal congestion with right septal deviation and bilateral turbinate hypertrophy, now much improved using Flonase\par - Continue Flonase and NSS\par \par Bilat. CI\par - Cleared\par - Ear hygiene\par - Discussed preventive measures and signs of accumulation\par \par Pt also with dry lips and mouth - Biotene\par Vaseline\par

## 2022-07-12 NOTE — PROCEDURE
unknown [FreeTextEntry3] : After informed verbal consent is obtained, cerumen is removed from the right and left ear canal with a curette and suction.\par  [FreeTextEntry6] : Procedure performed: Nasal Endoscopy- Diagnostic\par Pre / post -procedure indication(s): nasal congestion\par Verbal and/or written consent obtained from patient\par Scope #:9,  flexible fiber optic telescope used\par The scope was introduced in the nasal passage between the middle and inferior turbinates to exam the inferior portion of the middle meatus and the fontanelle, as well as the maxillary ostia.  Next, the scope was passed medically and posteriorly to the middle turbinates to examine the sphenoethmoid recess and the superior turbinate region.\par Upon visualization the finders are as follows:\par Nasal Septum: right septal deviation \par B/L: Mucosa: mucosal erythema and edema, Mucous: scant, Polyp: not seen, Inferior Turbinate, Middle and Superior Turbinate: turbinate hypertrophy Inferior Meatus: narrow, Middle Meatus: narrow, Super Meatus:normal, Sphenoethmoidal Recess: clear.  Eustachian tube clear. \par The patient tolerated the procedure well.  [de-identified] : Procedure performed: laryngeal Endoscopy- Diagnostic\par Pre-op/post op indication: globus\par Verbal and/or written consent obtained from patient\par Scope #: 9 flexible fiber optic telescope used.\par Scope was introduced through the nose passed on the floor of the nose to the nasopharynx and then followed down the soft palate to the lower pharynx. The tongue Base, Larynx, Hypopharynx were examined. Base of tongue was symmetric, vallecular was clear, epiglottis was not deformed, Glottis with fully mobile vocal cords without lesions or masses. Visualized subglottis clear. Postcricoid area with erythema and edema. + Mild reflux changes No intra-arytenoid bar. No pooling of secretions, masses or lesions. Airway patent, no foreign body visualized. No glottic/supraglottic edema. True vocal cords, arytenoids, vestibular folds, ventricles, pyriform sinuses, and aryepiglottic folds appear normal bilaterally. Vocal cords mobile with good contact b/l.\par +Viral laryngitis with small exudates

## 2022-07-12 NOTE — HISTORY OF PRESENT ILLNESS
[de-identified] : 58 y/o with 3 year history of dysphonia related to reflux and muscle tension. Still with hoarseness but no worsening or improvement since last visit in April 2019. On Omeprazole 3-4 times a week.\par No throat pain or difficulty swallowing. \par \par Also with longstanding history of bilat. nasal congestion. On Flonase daily with improvement in nasal congestion. \par No runny nose, facial pain, sinus pressure or PND. [FreeTextEntry1] : Pt states he is doing worse since the last time he saw us, states sore throat/hoarseness has gotten more bothersome in the last 4 days\par still taking medication for acid reflux- is getting better, feels its under control. Tapering off omeprazole since 11/2021, using every other day\par pt denies diff swallowing, eating, no diff breathing, not a smoker anymore- was smoker for 20 years \par \par pt states nasal congestion much improved, using flonase every other day, or as needed with relief \par pt denies sinus pressure no sinus infections \par \par feels lips and tongue is dry, now stable, but also feels they are not chapped and feel moist to touch \par Using Biotene as needed

## 2022-07-12 NOTE — PHYSICAL EXAM
[Midline] : trachea located in midline position [Normal] : no rashes [de-identified] : +mild left tenderness to palpation  [de-identified] : b/l CI [de-identified] : +mild pharyngeal erythema

## 2022-07-27 ENCOUNTER — APPOINTMENT (OUTPATIENT)
Dept: FAMILY MEDICINE | Facility: CLINIC | Age: 62
End: 2022-07-27

## 2022-07-27 VITALS
SYSTOLIC BLOOD PRESSURE: 152 MMHG | BODY MASS INDEX: 28.34 KG/M2 | HEART RATE: 66 BPM | HEIGHT: 68 IN | OXYGEN SATURATION: 98 % | WEIGHT: 187 LBS | DIASTOLIC BLOOD PRESSURE: 82 MMHG

## 2022-07-27 DIAGNOSIS — Z00.00 ENCOUNTER FOR GENERAL ADULT MEDICAL EXAMINATION W/OUT ABNORMAL FINDINGS: ICD-10-CM

## 2022-07-27 LAB
ALBUMIN SERPL ELPH-MCNC: 4.6 G/DL
ALP BLD-CCNC: 90 U/L
ALT SERPL-CCNC: 29 U/L
ANION GAP SERPL CALC-SCNC: 13 MMOL/L
APPEARANCE: CLEAR
AST SERPL-CCNC: 28 U/L
BASOPHILS # BLD AUTO: 0.05 K/UL
BASOPHILS NFR BLD AUTO: 1.1 %
BILIRUB SERPL-MCNC: 0.4 MG/DL
BILIRUBIN URINE: NEGATIVE
BLOOD URINE: NEGATIVE
BUN SERPL-MCNC: 11 MG/DL
CALCIUM SERPL-MCNC: 9.1 MG/DL
CHLORIDE SERPL-SCNC: 105 MMOL/L
CHOLEST SERPL-MCNC: 175 MG/DL
CO2 SERPL-SCNC: 24 MMOL/L
COLOR: YELLOW
CREAT SERPL-MCNC: 0.97 MG/DL
EGFR: 87 ML/MIN/1.73M2
EOSINOPHIL # BLD AUTO: 0.13 K/UL
EOSINOPHIL NFR BLD AUTO: 2.9 %
ESTIMATED AVERAGE GLUCOSE: 111 MG/DL
GLUCOSE QUALITATIVE U: NEGATIVE
GLUCOSE SERPL-MCNC: 79 MG/DL
HBA1C MFR BLD HPLC: 5.5 %
HCT VFR BLD CALC: 43.5 %
HDLC SERPL-MCNC: 50 MG/DL
HGB BLD-MCNC: 15 G/DL
IMM GRANULOCYTES NFR BLD AUTO: 0.7 %
KETONES URINE: NEGATIVE
LDLC SERPL CALC-MCNC: 99 MG/DL
LEUKOCYTE ESTERASE URINE: NEGATIVE
LYMPHOCYTES # BLD AUTO: 1.66 K/UL
LYMPHOCYTES NFR BLD AUTO: 36.5 %
MAN DIFF?: NORMAL
MCHC RBC-ENTMCNC: 34.5 GM/DL
MCHC RBC-ENTMCNC: 35 PG
MCV RBC AUTO: 101.6 FL
MONOCYTES # BLD AUTO: 0.51 K/UL
MONOCYTES NFR BLD AUTO: 11.2 %
NEUTROPHILS # BLD AUTO: 2.17 K/UL
NEUTROPHILS NFR BLD AUTO: 47.6 %
NITRITE URINE: NEGATIVE
NONHDLC SERPL-MCNC: 125 MG/DL
PH URINE: 6
PLATELET # BLD AUTO: 193 K/UL
POTASSIUM SERPL-SCNC: 4.8 MMOL/L
PROT SERPL-MCNC: 6.9 G/DL
PROTEIN URINE: NORMAL
RBC # BLD: 4.28 M/UL
RBC # FLD: 11.8 %
SODIUM SERPL-SCNC: 141 MMOL/L
SPECIFIC GRAVITY URINE: 1.02
TRIGL SERPL-MCNC: 131 MG/DL
TSH SERPL-ACNC: 0.77 UIU/ML
UROBILINOGEN URINE: NORMAL
WBC # FLD AUTO: 4.55 K/UL

## 2022-07-27 PROCEDURE — 99396 PREV VISIT EST AGE 40-64: CPT | Mod: 25

## 2022-07-27 PROCEDURE — 99406 BEHAV CHNG SMOKING 3-10 MIN: CPT

## 2022-07-27 PROCEDURE — G0296 VISIT TO DETERM LDCT ELIG: CPT

## 2022-07-27 NOTE — ASSESSMENT
[FreeTextEntry1] : Patient was counseled on healthy eating habits, on daily exercise and stress relief. All medications and allergies were reviewed with the patient and any adjustments necessary were made. Patient was counseled to try engage in an exercise activity for at least 30 minutes 3-4 times a week.  Patient was advised to eat a diet low in fat and carbohydrates and high in protein, with plenty of vegetables. Patient was advised to try and engage in relaxing activities whenever possible.\par The patients blood was draw in office and will be followed and assessed for any issues.  Patient was told to return to the office if any issues arise.  Unless otherwise stated, the patient is to continue all other medications as previously prescribed.\par \par smoking\par Smoking cessation was discussed with patient. All options were explained to the patient including available medications and their mechanism of action. Side affects and black box warnings were discussed as well. Patient was counseled on risks and benefits of available medications and risks of continued smoking including death. Greater than 5 minutes spent counseling patient.\par \par lung ct\par Patient with long smoking history. Reviewed history and smoking cessation strategies. Discussed benefits of low dose CT scan for early detection of lung cancer.  Risks and benefits reviewed.  Some risks discussed were radiation exposure, false positives, incidental findings. Will order low dose CT of lungs for assessment.\par \par hypertension\par The patient has a diagnosis of hypertension.The diagnosis was discussed with patient and need for medication compliance and possible side affects and risks of noncompliance. Patient was told to adhere to a low salt diet and try to incorporate exercise daily.\par \par reflux\par controlled\par \par cough\par still smoking\par improved with singulair, added ventolin, advair - wans't taking daily, will start and reassess\par \par \par \par \par \par

## 2022-07-27 NOTE — HISTORY OF PRESENT ILLNESS
[de-identified] : 62 year old male  here for annual well visit. Patient's blood work was drawn and medications reviewed. Patient's past medical history was reviewed, allergies verified and problems were identified and assessed. Patients medications were reviewed. Patient is feeling well with no new or active complaints at this time.\par \par \par  [Cigarettes ___ packs/day] : Patient smokes [unfilled] packs of cigarettes per day [___ Year(s)] : [unfilled] year(s) ago [Discussed Risks and Advised to Quit Smoking] : Discussed risks and advised to quit smoking [Ready] : Patient is ready for cessation intervention [Pre-contemplation] : Pre-contemplation: patient is not planning to quit within the next 6 months [Withdrawal symptoms] : withdrawal symptoms [Quit Smoking] : Quit Smoking

## 2022-07-27 NOTE — HEALTH RISK ASSESSMENT
[Very Good] : ~his/her~  mood as very good [Never] : Never [Yes] : Yes [No falls in past year] : Patient reported no falls in the past year [0] : 2) Feeling down, depressed, or hopeless: Not at all (0) [PHQ-2 Negative - No further assessment needed] : PHQ-2 Negative - No further assessment needed [[id number]] : 0 [Patient reported colonoscopy was normal] : Patient reported colonoscopy was normal [HIV Test offered] : HIV Test offered [Hepatitis C test offered] : Hepatitis C test offered [With Significant Other] : lives with significant other [Employed] : employed [] :  [# Of Children ___] : has [unfilled] children [Fully functional (bathing, dressing, toileting, transferring, walking, feeding)] : Fully functional (bathing, dressing, toileting, transferring, walking, feeding) [Fully functional (using the telephone, shopping, preparing meals, housekeeping, doing laundry, using] : Fully functional and needs no help or supervision to perform IADLs (using the telephone, shopping, preparing meals, housekeeping, doing laundry, using transportation, managing medications and managing finances) [ColonoscopyDate] : 2011 [de-identified] : retired

## 2022-08-02 ENCOUNTER — NON-APPOINTMENT (OUTPATIENT)
Age: 62
End: 2022-08-02

## 2022-08-02 DIAGNOSIS — F17.200 NICOTINE DEPENDENCE, UNSPECIFIED, UNCOMPLICATED: ICD-10-CM

## 2022-08-02 DIAGNOSIS — Z72.0 TOBACCO USE: ICD-10-CM

## 2022-08-05 ENCOUNTER — RX RENEWAL (OUTPATIENT)
Age: 62
End: 2022-08-05

## 2022-08-09 ENCOUNTER — NON-APPOINTMENT (OUTPATIENT)
Age: 62
End: 2022-08-09

## 2022-08-09 VITALS — WEIGHT: 185 LBS | BODY MASS INDEX: 29.03 KG/M2 | HEIGHT: 67 IN

## 2022-08-09 NOTE — HISTORY OF PRESENT ILLNESS
[Former] : former smoker [_____ pack-years] : [unfilled] pack-years [TextBox_13] : Referred by Dr. Lauryn Hardwick.\par \par Mr. JACKMAN is a 62 year old male with a history of HTN.\par \par He was called to review eligibility for Low-Dose CT lung cancer screening.  Reviewed and confirmed that the patient meets screening eligibility criteria:\par \par 62 years old \par \par Smoking Status: Former smoker \par \par Number of pack(s) per day: 1\par Number of years smoked: 27\par Number of pack years smokin\par \par Number of years since quitting smoking: 3\par Quit year: 2019\par \par Mr. JACKMAN denies any symptoms of lung cancer, including new cough, change in cough, hemoptysis, and unintentional weight loss.\par \par Mr. JACKMAN denies any personal history of lung cancer.  Admits to lung cancer in a first degree relative, his father.  Admits to exposure to asbestos and silica. Denies any history of lung disease. [TextBox_6] : 1 [TextBox_8] : 27 [TextBox_10] : 2019

## 2022-08-10 ENCOUNTER — RX RENEWAL (OUTPATIENT)
Age: 62
End: 2022-08-10

## 2022-08-11 ENCOUNTER — APPOINTMENT (OUTPATIENT)
Dept: CT IMAGING | Facility: CLINIC | Age: 62
End: 2022-08-11

## 2022-08-11 DIAGNOSIS — M54.9 DORSALGIA, UNSPECIFIED: ICD-10-CM

## 2022-08-11 PROCEDURE — 71271 CT THORAX LUNG CANCER SCR C-: CPT

## 2022-08-15 PROBLEM — M54.9 UPPER BACK PAIN: Status: ACTIVE | Noted: 2022-08-15

## 2022-08-18 ENCOUNTER — NON-APPOINTMENT (OUTPATIENT)
Age: 62
End: 2022-08-18

## 2022-08-18 ENCOUNTER — APPOINTMENT (OUTPATIENT)
Dept: ORTHOPEDIC SURGERY | Facility: CLINIC | Age: 62
End: 2022-08-18

## 2022-08-18 VITALS — BODY MASS INDEX: 29.03 KG/M2 | WEIGHT: 185 LBS | HEIGHT: 67 IN

## 2022-08-18 DIAGNOSIS — M54.2 CERVICALGIA: ICD-10-CM

## 2022-08-18 DIAGNOSIS — R20.2 PARESTHESIA OF SKIN: ICD-10-CM

## 2022-08-18 PROCEDURE — 99203 OFFICE O/P NEW LOW 30 MIN: CPT

## 2022-08-18 PROCEDURE — 72040 X-RAY EXAM NECK SPINE 2-3 VW: CPT | Mod: 26

## 2022-09-29 ENCOUNTER — RX RENEWAL (OUTPATIENT)
Age: 62
End: 2022-09-29

## 2022-11-29 ENCOUNTER — APPOINTMENT (OUTPATIENT)
Dept: FAMILY MEDICINE | Facility: CLINIC | Age: 62
End: 2022-11-29

## 2022-11-29 VITALS
DIASTOLIC BLOOD PRESSURE: 88 MMHG | HEART RATE: 78 BPM | HEIGHT: 67 IN | WEIGHT: 188 LBS | OXYGEN SATURATION: 99 % | TEMPERATURE: 98 F | BODY MASS INDEX: 29.51 KG/M2 | SYSTOLIC BLOOD PRESSURE: 150 MMHG

## 2022-11-29 VITALS — SYSTOLIC BLOOD PRESSURE: 154 MMHG | DIASTOLIC BLOOD PRESSURE: 80 MMHG

## 2022-11-29 DIAGNOSIS — N52.9 MALE ERECTILE DYSFUNCTION, UNSPECIFIED: ICD-10-CM

## 2022-11-29 DIAGNOSIS — K21.9 GASTRO-ESOPHAGEAL REFLUX DISEASE W/OUT ESOPHAGITIS: ICD-10-CM

## 2022-11-29 PROCEDURE — G0008: CPT

## 2022-11-29 PROCEDURE — 90686 IIV4 VACC NO PRSV 0.5 ML IM: CPT

## 2022-11-29 PROCEDURE — 99214 OFFICE O/P EST MOD 30 MIN: CPT | Mod: 25

## 2022-11-29 PROCEDURE — 36415 COLL VENOUS BLD VENIPUNCTURE: CPT

## 2022-11-29 RX ORDER — MELOXICAM 15 MG/1
15 TABLET ORAL
Qty: 30 | Refills: 1 | Status: DISCONTINUED | COMMUNITY
Start: 2022-08-18 | End: 2022-11-29

## 2022-11-29 RX ORDER — GABAPENTIN 300 MG/1
300 CAPSULE ORAL
Qty: 30 | Refills: 0 | Status: DISCONTINUED | COMMUNITY
Start: 2021-12-27 | End: 2022-11-29

## 2022-11-29 NOTE — HISTORY OF PRESENT ILLNESS
[FreeTextEntry1] : Here for follow-up; needs med refills.\par  [de-identified] : Here for follow-up; needs med refills.\par \par Checking BP at home-150s on BP most readings.\par Had labs done with [facility]. \par \par Eats some Some red meat-not a lot. \par No formal exercise but reaching 10,000 steps a day.  \par \par Just had repeat colonoscopy 2 weeks ago-needs to go back within the year-had area that needed to be removed. \par \par Wants to do flu shot today.\par \par Smoking-Quit 5 years ago; sometimes e ciggarette.\par \par Has been using Sildenafil prescribed by [facility]-not working as well-has been on for the past year.  \par \par PT helped a lot with the pain.  Symptoms have improved.

## 2022-11-29 NOTE — PLAN
[FreeTextEntry1] : Repeat BP still elevated-Will increase to 320mg valsartan. Keep BP log-will call us with updated readings. \par \par Labs from [facility] reviewed. \par Monitor diet for cholesterol-healthy diet discussed. \par \par Will follow up labwork drawn in office today.\par \par ED-patient will follow up with prescribing provider-discussed Urology evaluation. \par Received flu shot.\par Advised Mr. DESTINY JACKMAN they may experience some soreness, tenderness at the injection site.  Advised to call office if  not improving.  Mr. JACKMAN expressed understanding.\par \par Will adjust meds based on labs. \par Patient expressed understanding of plan.\par \par \par

## 2022-11-29 NOTE — REVIEW OF SYSTEMS
[Negative] : Genitourinary [Shortness Of Breath] : no shortness of breath [Wheezing] : no wheezing [Dysuria] : no dysuria [Headache] : no headache [Dizziness] : no dizziness [FreeTextEntry6] : using albuterol rarely [FreeTextEntry8] : no pain no urgency

## 2022-11-29 NOTE — PHYSICAL EXAM
[Normal Oropharynx] : the oropharynx was normal [No Edema] : there was no peripheral edema [No Extremity Clubbing/Cyanosis] : no extremity clubbing/cyanosis [Normal] : affect was normal and insight and judgment were intact [de-identified] : no wheeze

## 2022-11-30 ENCOUNTER — NON-APPOINTMENT (OUTPATIENT)
Age: 62
End: 2022-11-30

## 2022-11-30 LAB
ALBUMIN SERPL ELPH-MCNC: 4.5 G/DL
ALP BLD-CCNC: 86 U/L
ALT SERPL-CCNC: 25 U/L
ANION GAP SERPL CALC-SCNC: 13 MMOL/L
AST SERPL-CCNC: 23 U/L
BASOPHILS # BLD AUTO: 0.05 K/UL
BASOPHILS NFR BLD AUTO: 1 %
BILIRUB SERPL-MCNC: 0.4 MG/DL
BUN SERPL-MCNC: 12 MG/DL
CALCIUM SERPL-MCNC: 9.1 MG/DL
CHLORIDE SERPL-SCNC: 103 MMOL/L
CO2 SERPL-SCNC: 24 MMOL/L
CREAT SERPL-MCNC: 0.98 MG/DL
EGFR: 86 ML/MIN/1.73M2
EOSINOPHIL # BLD AUTO: 0.1 K/UL
EOSINOPHIL NFR BLD AUTO: 1.9 %
ESTIMATED AVERAGE GLUCOSE: 108 MG/DL
GLUCOSE SERPL-MCNC: 95 MG/DL
HBA1C MFR BLD HPLC: 5.4 %
HCT VFR BLD CALC: 41.6 %
HGB BLD-MCNC: 15 G/DL
IMM GRANULOCYTES NFR BLD AUTO: 0.4 %
LYMPHOCYTES # BLD AUTO: 1.5 K/UL
LYMPHOCYTES NFR BLD AUTO: 28.7 %
MAN DIFF?: NORMAL
MCHC RBC-ENTMCNC: 35 PG
MCHC RBC-ENTMCNC: 36.1 GM/DL
MCV RBC AUTO: 97 FL
MONOCYTES # BLD AUTO: 0.56 K/UL
MONOCYTES NFR BLD AUTO: 10.7 %
NEUTROPHILS # BLD AUTO: 2.99 K/UL
NEUTROPHILS NFR BLD AUTO: 57.3 %
PLATELET # BLD AUTO: 219 K/UL
POTASSIUM SERPL-SCNC: 4.1 MMOL/L
PROT SERPL-MCNC: 6.7 G/DL
PSA SERPL-MCNC: 0.61 NG/ML
RBC # BLD: 4.29 M/UL
RBC # FLD: 11.6 %
SODIUM SERPL-SCNC: 141 MMOL/L
TSH SERPL-ACNC: 0.72 UIU/ML
WBC # FLD AUTO: 5.22 K/UL

## 2022-12-27 ENCOUNTER — TRANSCRIPTION ENCOUNTER (OUTPATIENT)
Age: 62
End: 2022-12-27

## 2022-12-27 ENCOUNTER — APPOINTMENT (OUTPATIENT)
Dept: FAMILY MEDICINE | Facility: CLINIC | Age: 62
End: 2022-12-27
Payer: COMMERCIAL

## 2022-12-27 VITALS — OXYGEN SATURATION: 97 % | DIASTOLIC BLOOD PRESSURE: 79 MMHG | SYSTOLIC BLOOD PRESSURE: 176 MMHG | HEART RATE: 79 BPM

## 2022-12-27 DIAGNOSIS — R20.0 ANESTHESIA OF SKIN: ICD-10-CM

## 2022-12-27 DIAGNOSIS — R20.2 ANESTHESIA OF SKIN: ICD-10-CM

## 2022-12-27 PROCEDURE — 99214 OFFICE O/P EST MOD 30 MIN: CPT | Mod: 95

## 2022-12-27 NOTE — HISTORY OF PRESENT ILLNESS
[Home] : at home, [unfilled] , at the time of the visit. [Medical Office: ([facility])___] : at the medical office located in  [Verbal consent obtained from patient] : the patient, [unfilled] [FreeTextEntry8] : Telehealth for BP follow-up.\par \par Patient came into office earlier in day to check his BP machine against a manual reading by RN.  Readings were similar. \par \sadie Has been on the increased valsartan for 2 weeks. No major change in diet, no additional salt intake.  Has been feeling okay.\sadie Takes BP meds in the AM. \par \par Does note he has numbness and tingling in his left palm.  Has been going on for 1 week.  No recent injury.  Did have an issue with the left shoulder earlier this year with numbness and tingling, but that had improved.  Denies any finger locking or dropping items.  \par \par Medications and allergies reviewed.\par \par

## 2022-12-27 NOTE — PLAN
[FreeTextEntry1] : HTN-elevated readings despite increased Valsartan dose-will add HCTZ.\par Discussed blood pressure monitoring and normal ranges for BP.  Keep BP log.  Advised elevated readings or symptoms require immediate evaluation.   Follow-up in office in 2 weeks. \par Advised patient if they have a home blood pressure cuff, they may bring it with them to their next visit and compare machine readings.  \par Avoid added salt in diet.\par \par Left Hand numbness and tingling-possible carpal tunnel-Ortho Hand referral placed. \par \par Discussed with Mr. JACKMAN precautions and advised to go to seek immediate medical re-evaluation if condition worsened.  Mr. SAABNIS AUGUSTINA expressed understanding of the plan.\par

## 2022-12-27 NOTE — REVIEW OF SYSTEMS
[Negative] : Gastrointestinal [Fever] : no fever [Chills] : no chills [Headache] : no headache [FreeTextEntry9] : left hand discomfort

## 2022-12-27 NOTE — PHYSICAL EXAM
[No Acute Distress] : no acute distress [Normal] : affect was normal and insight and judgment were intact [de-identified] : No visible respiratory distress [de-identified] : Left arm and shoulder ROM intact;  [de-identified] : No rash

## 2023-01-08 ENCOUNTER — TRANSCRIPTION ENCOUNTER (OUTPATIENT)
Age: 63
End: 2023-01-08

## 2023-01-13 ENCOUNTER — APPOINTMENT (OUTPATIENT)
Dept: FAMILY MEDICINE | Facility: CLINIC | Age: 63
End: 2023-01-13
Payer: COMMERCIAL

## 2023-01-13 VITALS
SYSTOLIC BLOOD PRESSURE: 134 MMHG | HEIGHT: 67 IN | OXYGEN SATURATION: 97 % | WEIGHT: 185 LBS | DIASTOLIC BLOOD PRESSURE: 72 MMHG | TEMPERATURE: 98 F | BODY MASS INDEX: 29.03 KG/M2 | HEART RATE: 77 BPM

## 2023-01-13 PROCEDURE — 99213 OFFICE O/P EST LOW 20 MIN: CPT

## 2023-01-13 NOTE — REVIEW OF SYSTEMS
[Negative] : Genitourinary [Fever] : no fever [Chills] : no chills [Chest Pain] : no chest pain [Palpitations] : no palpitations [Shortness Of Breath] : no shortness of breath [Wheezing] : no wheezing [Headache] : no headache [Dizziness] : no dizziness

## 2023-01-13 NOTE — PHYSICAL EXAM
[Normal Sclera/Conjunctiva] : normal sclera/conjunctiva [PERRL] : pupils equal round and reactive to light [Normal Oropharynx] : the oropharynx was normal [Normal] : normal rate, regular rhythm, normal S1 and S2 and no murmur heard [No Extremity Clubbing/Cyanosis] : no extremity clubbing/cyanosis

## 2023-01-13 NOTE — PLAN
[FreeTextEntry1] : HTN-stable exam today.\par Restart HCTZ-no further episodes of dizziness.  Will split BP meds. \par \par Take Metoprolol and HCTZ in AM and Valsartan PM.\par Continue BP log.\par Asked patient to call with update on his numbers.\par Discussed with Bessy AUGUSTINA precautions and advised to go to seek medical re-evaluation if condition worsened.  Mr. DESTINY JACKMAN expressed understanding of the plan.\par

## 2023-02-09 ENCOUNTER — RX RENEWAL (OUTPATIENT)
Age: 63
End: 2023-02-09

## 2023-02-28 ENCOUNTER — RX RENEWAL (OUTPATIENT)
Age: 63
End: 2023-02-28

## 2023-03-01 ENCOUNTER — RX RENEWAL (OUTPATIENT)
Age: 63
End: 2023-03-01

## 2023-05-29 ENCOUNTER — RX RENEWAL (OUTPATIENT)
Age: 63
End: 2023-05-29

## 2023-06-26 ENCOUNTER — RX RENEWAL (OUTPATIENT)
Age: 63
End: 2023-06-26

## 2023-07-10 ENCOUNTER — RX RENEWAL (OUTPATIENT)
Age: 63
End: 2023-07-10

## 2023-08-01 ENCOUNTER — APPOINTMENT (OUTPATIENT)
Dept: FAMILY MEDICINE | Facility: CLINIC | Age: 63
End: 2023-08-01
Payer: MEDICARE

## 2023-08-01 VITALS
TEMPERATURE: 98.5 F | DIASTOLIC BLOOD PRESSURE: 76 MMHG | RESPIRATION RATE: 17 BRPM | WEIGHT: 189 LBS | OXYGEN SATURATION: 99 % | HEART RATE: 62 BPM | BODY MASS INDEX: 29.66 KG/M2 | SYSTOLIC BLOOD PRESSURE: 137 MMHG | HEIGHT: 67 IN

## 2023-08-01 DIAGNOSIS — F41.9 ANXIETY DISORDER, UNSPECIFIED: ICD-10-CM

## 2023-08-01 PROCEDURE — 36415 COLL VENOUS BLD VENIPUNCTURE: CPT

## 2023-08-01 PROCEDURE — 99214 OFFICE O/P EST MOD 30 MIN: CPT | Mod: 25

## 2023-08-02 LAB
ALBUMIN SERPL ELPH-MCNC: 4.7 G/DL
ALP BLD-CCNC: 81 U/L
ALT SERPL-CCNC: 23 U/L
ANION GAP SERPL CALC-SCNC: 13 MMOL/L
AST SERPL-CCNC: 23 U/L
BILIRUB SERPL-MCNC: 0.5 MG/DL
BUN SERPL-MCNC: 12 MG/DL
CALCIUM SERPL-MCNC: 9.7 MG/DL
CHLORIDE SERPL-SCNC: 102 MMOL/L
CHOLEST SERPL-MCNC: 190 MG/DL
CO2 SERPL-SCNC: 24 MMOL/L
CREAT SERPL-MCNC: 0.91 MG/DL
EGFR: 94 ML/MIN/1.73M2
GLUCOSE SERPL-MCNC: 96 MG/DL
HDLC SERPL-MCNC: 53 MG/DL
LDLC SERPL CALC-MCNC: 120 MG/DL
NONHDLC SERPL-MCNC: 137 MG/DL
POTASSIUM SERPL-SCNC: 4.5 MMOL/L
PROT SERPL-MCNC: 7 G/DL
SODIUM SERPL-SCNC: 139 MMOL/L
TRIGL SERPL-MCNC: 97 MG/DL
TSH SERPL-ACNC: 0.67 UIU/ML

## 2023-08-08 ENCOUNTER — NON-APPOINTMENT (OUTPATIENT)
Age: 63
End: 2023-08-08

## 2023-08-08 VITALS — BODY MASS INDEX: 29.66 KG/M2 | HEIGHT: 67 IN | WEIGHT: 189 LBS

## 2023-08-08 NOTE — HISTORY OF PRESENT ILLNESS
[Former] : Former [TextBox_13] : Referred by Dr. Marylin Doan. Mr. JACKMAN is a 63 year old male with a history of HTN. Reviewed and confirmed that the patient meets screening eligibility criteria: 63 years old Smoking Status: Former smoker Number of pack(s) per day: 1 Number of years smoked: 27 Number of pack years smokin Quit year:  No symptoms of lung cancer, including new cough, change in cough, hemoptysis, and unintentional weight loss. No personal history of lung cancer. History of lung cancer in a first degree relative, his father. History of exposure to asbestos and silica. No history of lung disease.   [YearQuit] : 2019 [PacksperYear] : 27

## 2023-08-15 ENCOUNTER — APPOINTMENT (OUTPATIENT)
Dept: CT IMAGING | Facility: CLINIC | Age: 63
End: 2023-08-15
Payer: MEDICARE

## 2023-08-15 PROCEDURE — 71271 CT THORAX LUNG CANCER SCR C-: CPT

## 2023-08-24 ENCOUNTER — RX CHANGE (OUTPATIENT)
Age: 63
End: 2023-08-24

## 2023-08-24 RX ORDER — HYDROCHLOROTHIAZIDE 12.5 MG/1
12.5 TABLET ORAL
Qty: 30 | Refills: 0 | Status: DISCONTINUED | COMMUNITY
Start: 2022-12-27 | End: 2023-08-24

## 2023-10-03 ENCOUNTER — APPOINTMENT (OUTPATIENT)
Dept: FAMILY MEDICINE | Facility: CLINIC | Age: 63
End: 2023-10-03
Payer: MEDICARE

## 2023-10-03 VITALS
OXYGEN SATURATION: 99 % | WEIGHT: 188 LBS | SYSTOLIC BLOOD PRESSURE: 124 MMHG | BODY MASS INDEX: 29.51 KG/M2 | HEART RATE: 69 BPM | TEMPERATURE: 98.2 F | DIASTOLIC BLOOD PRESSURE: 80 MMHG | RESPIRATION RATE: 15 BRPM | HEIGHT: 67 IN

## 2023-10-03 DIAGNOSIS — E66.3 OVERWEIGHT: ICD-10-CM

## 2023-10-03 PROCEDURE — 90662 IIV NO PRSV INCREASED AG IM: CPT

## 2023-10-03 PROCEDURE — 36415 COLL VENOUS BLD VENIPUNCTURE: CPT

## 2023-10-03 PROCEDURE — G0403: CPT

## 2023-10-03 PROCEDURE — G0008: CPT

## 2023-10-03 PROCEDURE — G0402 INITIAL PREVENTIVE EXAM: CPT

## 2023-10-03 RX ORDER — CLOTRIMAZOLE 10 MG/1
10 LOZENGE ORAL 4 TIMES DAILY
Qty: 40 | Refills: 0 | Status: DISCONTINUED | COMMUNITY
Start: 2021-12-06 | End: 2023-10-03

## 2023-10-11 ENCOUNTER — TRANSCRIPTION ENCOUNTER (OUTPATIENT)
Age: 63
End: 2023-10-11

## 2023-10-23 ENCOUNTER — APPOINTMENT (OUTPATIENT)
Dept: OTOLARYNGOLOGY | Facility: CLINIC | Age: 63
End: 2023-10-23
Payer: MEDICARE

## 2023-10-23 VITALS
BODY MASS INDEX: 29.51 KG/M2 | WEIGHT: 188 LBS | TEMPERATURE: 98.2 F | HEART RATE: 62 BPM | DIASTOLIC BLOOD PRESSURE: 78 MMHG | HEIGHT: 67 IN | SYSTOLIC BLOOD PRESSURE: 158 MMHG

## 2023-10-23 DIAGNOSIS — R49.0 DYSPHONIA: ICD-10-CM

## 2023-10-23 DIAGNOSIS — J31.0 CHRONIC RHINITIS: ICD-10-CM

## 2023-10-23 DIAGNOSIS — K21.9 GASTRO-ESOPHAGEAL REFLUX DISEASE W/OUT ESOPHAGITIS: ICD-10-CM

## 2023-10-23 DIAGNOSIS — R09.81 NASAL CONGESTION: ICD-10-CM

## 2023-10-23 PROCEDURE — 99213 OFFICE O/P EST LOW 20 MIN: CPT | Mod: 25

## 2023-10-23 PROCEDURE — 31231 NASAL ENDOSCOPY DX: CPT

## 2023-11-27 ENCOUNTER — APPOINTMENT (OUTPATIENT)
Dept: FAMILY MEDICINE | Facility: CLINIC | Age: 63
End: 2023-11-27
Payer: MEDICARE

## 2023-11-27 VITALS — DIASTOLIC BLOOD PRESSURE: 80 MMHG | SYSTOLIC BLOOD PRESSURE: 126 MMHG

## 2023-11-27 VITALS
DIASTOLIC BLOOD PRESSURE: 82 MMHG | WEIGHT: 190 LBS | HEART RATE: 66 BPM | SYSTOLIC BLOOD PRESSURE: 139 MMHG | BODY MASS INDEX: 29.82 KG/M2 | TEMPERATURE: 97.7 F | OXYGEN SATURATION: 98 % | HEIGHT: 67 IN | RESPIRATION RATE: 16 BRPM

## 2023-11-27 DIAGNOSIS — Z12.5 ENCOUNTER FOR SCREENING FOR MALIGNANT NEOPLASM OF PROSTATE: ICD-10-CM

## 2023-11-27 DIAGNOSIS — S61.411A LACERATION W/OUT FOREIGN BODY OF RIGHT HAND, INITIAL ENCOUNTER: ICD-10-CM

## 2023-11-27 PROCEDURE — 99214 OFFICE O/P EST MOD 30 MIN: CPT | Mod: 25

## 2023-11-27 PROCEDURE — 36415 COLL VENOUS BLD VENIPUNCTURE: CPT

## 2023-11-27 RX ORDER — ALBUTEROL SULFATE 90 UG/1
108 (90 BASE) AEROSOL, METERED RESPIRATORY (INHALATION)
Qty: 1 | Refills: 1 | Status: ACTIVE | COMMUNITY
Start: 2019-12-04 | End: 1900-01-01

## 2023-11-27 RX ORDER — MONTELUKAST 10 MG/1
10 TABLET, FILM COATED ORAL
Qty: 1 | Refills: 0 | Status: ACTIVE | COMMUNITY
Start: 2019-08-13 | End: 1900-01-01

## 2023-11-29 ENCOUNTER — APPOINTMENT (OUTPATIENT)
Dept: FAMILY MEDICINE | Facility: CLINIC | Age: 63
End: 2023-11-29

## 2023-11-29 LAB — PSA SERPL-MCNC: 0.55 NG/ML

## 2023-12-19 ENCOUNTER — APPOINTMENT (OUTPATIENT)
Dept: FAMILY MEDICINE | Facility: CLINIC | Age: 63
End: 2023-12-19

## 2024-01-24 ENCOUNTER — RX RENEWAL (OUTPATIENT)
Age: 64
End: 2024-01-24

## 2024-01-24 RX ORDER — VALSARTAN 320 MG/1
320 TABLET, COATED ORAL DAILY
Qty: 90 | Refills: 1 | Status: ACTIVE | COMMUNITY
Start: 2018-03-20 | End: 1900-01-01

## 2024-02-27 ENCOUNTER — RX RENEWAL (OUTPATIENT)
Age: 64
End: 2024-02-27

## 2024-04-22 ENCOUNTER — NON-APPOINTMENT (OUTPATIENT)
Age: 64
End: 2024-04-22

## 2024-04-22 ENCOUNTER — APPOINTMENT (OUTPATIENT)
Dept: FAMILY MEDICINE | Facility: CLINIC | Age: 64
End: 2024-04-22
Payer: MEDICARE

## 2024-04-22 VITALS
SYSTOLIC BLOOD PRESSURE: 150 MMHG | BODY MASS INDEX: 30.45 KG/M2 | HEIGHT: 67 IN | OXYGEN SATURATION: 98 % | TEMPERATURE: 97.7 F | SYSTOLIC BLOOD PRESSURE: 147 MMHG | HEART RATE: 77 BPM | DIASTOLIC BLOOD PRESSURE: 69 MMHG | RESPIRATION RATE: 16 BRPM | DIASTOLIC BLOOD PRESSURE: 76 MMHG | WEIGHT: 194 LBS

## 2024-04-22 DIAGNOSIS — R00.2 PALPITATIONS: ICD-10-CM

## 2024-04-22 PROCEDURE — 93000 ELECTROCARDIOGRAM COMPLETE: CPT

## 2024-04-22 PROCEDURE — 99214 OFFICE O/P EST MOD 30 MIN: CPT | Mod: 25

## 2024-04-22 PROCEDURE — 36415 COLL VENOUS BLD VENIPUNCTURE: CPT

## 2024-04-22 RX ORDER — NEOMYCIN SULFATE, POLYMYXIN B SULFATE AND DEXAMETHASONE 3.5; 10000; 1 MG/ML; [USP'U]/ML; MG/ML
3.5-10000-0.1 SUSPENSION OPHTHALMIC
Qty: 5 | Refills: 0 | Status: COMPLETED | COMMUNITY
Start: 2024-03-18

## 2024-04-22 RX ORDER — AMOXICILLIN 500 MG/1
500 TABLET, FILM COATED ORAL
Qty: 30 | Refills: 0 | Status: DISCONTINUED | COMMUNITY
Start: 2024-02-23

## 2024-04-22 RX ORDER — TOBRAMYCIN 3 MG/ML
0.3 SOLUTION/ DROPS OPHTHALMIC
Qty: 5 | Refills: 0 | Status: DISCONTINUED | COMMUNITY
Start: 2024-02-23

## 2024-04-22 NOTE — PHYSICAL EXAM
[No Acute Distress] : no acute distress [Normal Oropharynx] : the oropharynx was normal [No Lymphadenopathy] : no lymphadenopathy [Thyroid Normal, No Nodules] : the thyroid was normal and there were no nodules present [No Edema] : there was no peripheral edema [No Extremity Clubbing/Cyanosis] : no extremity clubbing/cyanosis [Normal] : affect was normal and insight and judgment were intact

## 2024-04-22 NOTE — PLAN
[FreeTextEntry1] : LXE-Kngdamvxkbvd-EEK performed-sinus rhythm, non-acute similar to prior.  Given episodes of palpitations, HTN, recommending Cardio re-eval; has not seen in a few years. Discussed with Mr. JACKMAN precautions and advised to go to Emergency Room if condition worsened.  Mr. DESTINY JACKMAN expressed understanding of the plan.  HTN-repeat BP still elevated.  Increase HCTZ to 25mg.  Return for BP check in 2-3 weeks with BP cuff.  COntinue metoprolol and Valsartan 320mg. HLD-recheck levels today; goal LDL 70.  MARTIN-restart maintenance inhaler.  Will adjust meds based on labs.  Patient expressed understanding of plan.

## 2024-04-22 NOTE — HISTORY OF PRESENT ILLNESS
[FreeTextEntry1] : Here for follow-up; needs med refills. [de-identified] : Here for follow-up, BP check. Feels pressure at times in chest.  Happening the past 3 weeks.  Every few days.  Palpitations a few times.  SOB with walking. Has not been using inhalers recently.  Has not been using advair or rescue inhaler.   Flonase has been helping-drip and stuffy nose.    BP at home -186 most in 150s DBP .   Does not have cuff with him.

## 2024-04-22 NOTE — REVIEW OF SYSTEMS
[Nasal Discharge] : nasal discharge [Negative] : Genitourinary [Fever] : no fever [Chills] : no chills [Sore Throat] : no sore throat [Chest Pain] : no chest pain [Palpitations] : no palpitations [Wheezing] : no wheezing [Cough] : no cough [Nausea] : no nausea [Constipation] : no constipation [Diarrhea] : no diarrhea [Vomiting] : no vomiting [Heartburn] : no heartburn [Headache] : no headache [Dizziness] : no dizziness [FreeTextEntry4] : congestion [FreeTextEntry5] : gets episodes of palpitations

## 2024-04-23 LAB
ALBUMIN SERPL ELPH-MCNC: 4.6 G/DL
ALP BLD-CCNC: 80 U/L
ALT SERPL-CCNC: 24 U/L
ANION GAP SERPL CALC-SCNC: 12 MMOL/L
AST SERPL-CCNC: 26 U/L
BASOPHILS # BLD AUTO: 0.06 K/UL
BASOPHILS NFR BLD AUTO: 1 %
BILIRUB SERPL-MCNC: 0.5 MG/DL
BUN SERPL-MCNC: 11 MG/DL
CALCIUM SERPL-MCNC: 9.7 MG/DL
CHLORIDE SERPL-SCNC: 97 MMOL/L
CHOLEST SERPL-MCNC: 180 MG/DL
CO2 SERPL-SCNC: 27 MMOL/L
CREAT SERPL-MCNC: 0.99 MG/DL
EGFR: 84 ML/MIN/1.73M2
EOSINOPHIL # BLD AUTO: 0.09 K/UL
EOSINOPHIL NFR BLD AUTO: 1.4 %
ESTIMATED AVERAGE GLUCOSE: 105 MG/DL
GLUCOSE SERPL-MCNC: 94 MG/DL
HBA1C MFR BLD HPLC: 5.3 %
HCT VFR BLD CALC: 42.4 %
HDLC SERPL-MCNC: 53 MG/DL
HGB BLD-MCNC: 14.7 G/DL
IMM GRANULOCYTES NFR BLD AUTO: 0.5 %
LDLC SERPL CALC-MCNC: 102 MG/DL
LYMPHOCYTES # BLD AUTO: 1.37 K/UL
LYMPHOCYTES NFR BLD AUTO: 21.9 %
MAN DIFF?: NORMAL
MCHC RBC-ENTMCNC: 34.4 PG
MCHC RBC-ENTMCNC: 34.7 GM/DL
MCV RBC AUTO: 99.3 FL
MONOCYTES # BLD AUTO: 0.57 K/UL
MONOCYTES NFR BLD AUTO: 9.1 %
NEUTROPHILS # BLD AUTO: 4.14 K/UL
NEUTROPHILS NFR BLD AUTO: 66.1 %
NONHDLC SERPL-MCNC: 126 MG/DL
PLATELET # BLD AUTO: 242 K/UL
POTASSIUM SERPL-SCNC: 4.5 MMOL/L
PROT SERPL-MCNC: 7.1 G/DL
RBC # BLD: 4.27 M/UL
RBC # FLD: 12 %
SODIUM SERPL-SCNC: 136 MMOL/L
TRIGL SERPL-MCNC: 139 MG/DL
TSH SERPL-ACNC: 0.69 UIU/ML
WBC # FLD AUTO: 6.26 K/UL

## 2024-05-08 ENCOUNTER — APPOINTMENT (OUTPATIENT)
Dept: FAMILY MEDICINE | Facility: CLINIC | Age: 64
End: 2024-05-08
Payer: MEDICARE

## 2024-05-08 VITALS
SYSTOLIC BLOOD PRESSURE: 133 MMHG | HEART RATE: 66 BPM | WEIGHT: 193 LBS | BODY MASS INDEX: 30.29 KG/M2 | RESPIRATION RATE: 16 BRPM | OXYGEN SATURATION: 97 % | HEIGHT: 67 IN | TEMPERATURE: 97.2 F | DIASTOLIC BLOOD PRESSURE: 77 MMHG

## 2024-05-08 PROCEDURE — 99213 OFFICE O/P EST LOW 20 MIN: CPT

## 2024-05-08 NOTE — PHYSICAL EXAM
[Well-Appearing] : well-appearing [Normal Oropharynx] : the oropharynx was normal [No Lymphadenopathy] : no lymphadenopathy [Thyroid Normal, No Nodules] : the thyroid was normal and there were no nodules present [No Edema] : there was no peripheral edema [No Extremity Clubbing/Cyanosis] : no extremity clubbing/cyanosis [Normal] : affect was normal and insight and judgment were intact [de-identified] : no calf tenderness to palpation

## 2024-05-08 NOTE — REVIEW OF SYSTEMS
[Negative] : Gastrointestinal [Fever] : no fever [Chills] : no chills [Chest Pain] : no chest pain [Palpitations] : no palpitations [Wheezing] : no wheezing [Cough] : no cough [Dysuria] : no dysuria [Headache] : no headache [Dizziness] : no dizziness [FreeTextEntry9] : some episodes of cramps

## 2024-05-08 NOTE — HISTORY OF PRESENT ILLNESS
[FreeTextEntry1] : Follow-up BP check [de-identified] : Follow-up BP check Brought in home cuff-ran it after our reading-patient reports it was 10 points higher than our office reading.   Feeling some muscle cramps for the past two weeks. Urinating a bit more with HCTZ 1 cup of caffeinated coffee per day.  Home BP readings- 113-170/69-93   Improved from last visit.   Seeing Cardio next week.

## 2024-05-08 NOTE — PLAN
[FreeTextEntry1] : HTN-BP trending down.  Better reading in office-home log reviewed.  Continue HCTZ. Continue to monitor salt content-patient has been watching diet.   Discussed increasing hydration.  Drinking plenty of water-6-8 glasses of water per day.  Discussed gentle stretching before bed time.  Return for routine lab check. Pt. expressed understanding.

## 2024-05-16 ENCOUNTER — APPOINTMENT (OUTPATIENT)
Dept: CARDIOLOGY | Facility: CLINIC | Age: 64
End: 2024-05-16

## 2024-05-21 ENCOUNTER — APPOINTMENT (OUTPATIENT)
Dept: CARDIOLOGY | Facility: CLINIC | Age: 64
End: 2024-05-21

## 2024-05-28 ENCOUNTER — RX RENEWAL (OUTPATIENT)
Age: 64
End: 2024-05-28

## 2024-05-31 ENCOUNTER — NON-APPOINTMENT (OUTPATIENT)
Age: 64
End: 2024-05-31

## 2024-05-31 ENCOUNTER — APPOINTMENT (OUTPATIENT)
Dept: CARDIOLOGY | Facility: CLINIC | Age: 64
End: 2024-05-31
Payer: MEDICARE

## 2024-05-31 VITALS
RESPIRATION RATE: 16 BRPM | BODY MASS INDEX: 29.66 KG/M2 | DIASTOLIC BLOOD PRESSURE: 69 MMHG | HEART RATE: 66 BPM | SYSTOLIC BLOOD PRESSURE: 131 MMHG | HEIGHT: 67 IN | WEIGHT: 189 LBS | OXYGEN SATURATION: 97 %

## 2024-05-31 VITALS — SYSTOLIC BLOOD PRESSURE: 122 MMHG | OXYGEN SATURATION: 98 % | HEART RATE: 73 BPM | DIASTOLIC BLOOD PRESSURE: 65 MMHG

## 2024-05-31 VITALS — SYSTOLIC BLOOD PRESSURE: 131 MMHG | OXYGEN SATURATION: 99 % | DIASTOLIC BLOOD PRESSURE: 74 MMHG | HEART RATE: 67 BPM

## 2024-05-31 DIAGNOSIS — E78.5 HYPERLIPIDEMIA, UNSPECIFIED: ICD-10-CM

## 2024-05-31 DIAGNOSIS — I10 ESSENTIAL (PRIMARY) HYPERTENSION: ICD-10-CM

## 2024-05-31 DIAGNOSIS — J34.2 DEVIATED NASAL SEPTUM: ICD-10-CM

## 2024-05-31 DIAGNOSIS — R06.83 SNORING: ICD-10-CM

## 2024-05-31 DIAGNOSIS — E66.9 OBESITY, UNSPECIFIED: ICD-10-CM

## 2024-05-31 DIAGNOSIS — Z71.3 DIETARY COUNSELING AND SURVEILLANCE: ICD-10-CM

## 2024-05-31 DIAGNOSIS — R06.09 OTHER FORMS OF DYSPNEA: ICD-10-CM

## 2024-05-31 DIAGNOSIS — Z87.891 PERSONAL HISTORY OF NICOTINE DEPENDENCE: ICD-10-CM

## 2024-05-31 PROCEDURE — G2211 COMPLEX E/M VISIT ADD ON: CPT

## 2024-05-31 PROCEDURE — 99205 OFFICE O/P NEW HI 60 MIN: CPT

## 2024-05-31 PROCEDURE — 93000 ELECTROCARDIOGRAM COMPLETE: CPT

## 2024-05-31 PROCEDURE — 93040 RHYTHM ECG WITH REPORT: CPT | Mod: 59

## 2024-05-31 RX ORDER — HYDROCHLOROTHIAZIDE 50 MG/1
50 TABLET ORAL DAILY
Qty: 90 | Refills: 0 | Status: ACTIVE | COMMUNITY

## 2024-05-31 RX ORDER — SILDENAFIL 20 MG/1
20 TABLET ORAL AS DIRECTED
Refills: 0 | Status: DISCONTINUED | COMMUNITY
Start: 2022-11-29 | End: 2024-05-31

## 2024-05-31 RX ORDER — FLUTICASONE PROPIONATE AND SALMETEROL 250; 50 UG/1; UG/1
250-50 POWDER RESPIRATORY (INHALATION)
Refills: 0 | Status: ACTIVE | COMMUNITY
Start: 2024-05-31

## 2024-05-31 RX ORDER — FLUTICASONE PROPIONATE AND SALMETEROL 250; 50 UG/1; UG/1
250-50 POWDER RESPIRATORY (INHALATION)
Qty: 1 | Refills: 2 | Status: DISCONTINUED | COMMUNITY
Start: 2019-12-04 | End: 2024-05-31

## 2024-05-31 NOTE — PHYSICAL EXAM
[No Precordial Heave] : no precordial heave was noted [Normal Rate] : normal [Heart Rate ___] : [unfilled] bpm [Rhythm Regular] : regular [Normal S1] : normal S1 [Normal S2] : normal S2 [No Gallop] : no gallop heard [No Murmur] : no murmurs heard [No Pitting Edema] : no pitting edema present [2+] : left 2+ [No Abnormalities] : the abdominal aorta was not enlarged and no bruit was heard [General Appearance - Well Developed] : well developed [Normal Appearance] : normal appearance [Well Groomed] : well groomed [General Appearance - Well Nourished] : well nourished [No Deformities] : no deformities [General Appearance - In No Acute Distress] : no acute distress [Normal Conjunctiva] : the conjunctiva exhibited no abnormalities [Eyelids - No Xanthelasma] : the eyelids demonstrated no xanthelasmas [Normal Oral Mucosa] : normal oral mucosa [No Oral Pallor] : no oral pallor [No Oral Cyanosis] : no oral cyanosis [Normal Oropharynx] : normal oropharynx [Normal] : was normal [Absent] : absent [Normal Jugular Venous A Waves Present] : normal jugular venous A waves present [Normal Jugular Venous V Waves Present] : normal jugular venous V waves present [No Jugular Venous Yip A Waves] : no jugular venous yip A waves [Respiration, Rhythm And Depth] : normal respiratory rhythm and effort [Exaggerated Use Of Accessory Muscles For Inspiration] : no accessory muscle use [Auscultation Breath Sounds / Voice Sounds] : lungs were clear to auscultation bilaterally [Prolonged Exp Time] : with prolonged expiratory time [Increased E/I Ratio] : with increased expiratory/inspiratory ratio [Abdomen Soft] : soft [Abdomen Tenderness] : non-tender [Abdomen Mass (___ Cm)] : no abdominal mass palpated [Abnormal Walk] : normal gait [Gait - Sufficient For Exercise Testing] : the gait was sufficient for exercise testing [Nail Clubbing] : no clubbing of the fingernails [Cyanosis, Localized] : no localized cyanosis [Petechial Hemorrhages (___cm)] : no petechial hemorrhages [Skin Color & Pigmentation] : normal skin color and pigmentation [] : no rash [No Venous Stasis] : no venous stasis [Skin Lesions] : no skin lesions [No Skin Ulcers] : no skin ulcer [No Xanthoma] : no  xanthoma was observed [Oriented To Time, Place, And Person] : oriented to person, place, and time [Affect] : the affect was normal [Mood] : the mood was normal [No Anxiety] : not feeling anxious [Apical Thrill] : no thrill palpable at the apex [Click] : no click [Pericardial Rub] : no pericardial rub [Rt] : no varicose veins of the right leg [Lt] : no varicose veins of the left leg [Right Carotid Bruit] : no bruit heard over the right carotid [Left Carotid Bruit] : no bruit heard over the left carotid [de-identified] : Mallampati Class II with full view of uvula and partial view of palate [de-identified] : Neck circumf 16.5" [Erythema] : no erythema [Macroglossia] : was not enlarged (macroglossia) [FreeTextEntry1] : very mild nasal rosacea noted

## 2024-05-31 NOTE — ADDENDUM
[FreeTextEntry1] : I spent 60 minutes face to face time with the patient from 8:00 to 9:00 on 5/31/24.Prior to the visit, I reviewed office medicine, otolaryngology, and pulmonary notes. Labs 4/22/24 were reviewed. Automated hemodynamics were reviewed and analyzed. This review took 30 minutes. Thus, total visit time was 90 minutes

## 2024-05-31 NOTE — REASON FOR VISIT
[Hyperlipidemia] : hyperlipidemia [Hypertension] : hypertension [FreeTextEntry1] : Kerwin Tavera has h/o HTN, HLD, palpitations, class I obesity, deviated septum, GERD and former cig smoking. He presented for opinion regarding BP., with long h/o htn managed with metoprolol succinate ER 25 mg daily and valsartan 160 mg daily, well tolerated, though office /88 mm Hg on 4/23/19, 145/95 mm Hg on 4/2/19, and 180/91 mm hg on 10/23/18, no associated symptoms. There was SOB with mild exertion eg 1.5 flights of stairs, not with walking on level ground. During periods of dyspnea, there was no associated chest pain, palpitations or lightheadedness. He was a retired . He used Advil 400 mg approximately 2 - 3 times per week. He generally slept well, though noted intermittent loud snoring. There was no witnessed apnea. ESS=8. He consumed 3 - 4 beers every other day. He used one 18 mg E-cig daily. Previously, he smoked up to 1.5 PPD from age 24 - 40 years. He quit for 10 years and then at age 50 years resumed smoking, both cigarettes and cigars, quitting 2019, using E-cigs. He used Flonase for chronic nasal congestion and intermittently did nasal saline washing. Nasal endoscopy 4/23/19 noted severe right side septal deviation. Conservative management was recommended (not surgery). Family history noted HTN in first degree relative (brother, father), no family h/o premature CAD, MI, CHF, stroke or renal failure. By 5/31/24, he was on metoprolol ER, HCTZ and valsartan, self-obtained -170 / 69 - 93 mm Hg, mostly higher end and higher than office readings.

## 2024-05-31 NOTE — DISCUSSION/SUMMARY
[FreeTextEntry1] : WEIGHT GOALS:  Category				BMI range - kg/m2	BMI Prime Very severely underweight		less than 15		less than 0.60	 Severely underweight			from 15.0 to 16.0		from 0.60 to 0.64	 Underweight				from 16.0 to 18.5		from 0.64 to 0.74	 Normal (healthy weight)			from 18.5 to 25		from 0.74 to 1.0	 Overweight				from 25 to 30		from 1.0 to 1.2	 Obese Class I (Moderately obese)		from 30 to 35		from 1.2 to 1.4	 Obese Class II (Severely obese)		from 35 to 40		from 1.4 to 1.6	 Obese Class III (Very severely obese)	over 40			over 1.6	  Your current weight is 189 lbs (193 lbs on 5/8/24) Given current weight and height 5'7", your calculated body mass index (BMI) is 29.6 kg/sqm. Normal BMI is 18.5-25 kg/sqm. Thus, current weight is in the overweight category. Abdominal waist circumference is measured at the level of the umbilicus and is thus not a pants waist measurement. Your current abdominal waist circumference is 41.5". Normal abdominal waist circumference is < 32 inches for women and < 37 inches for men  DIETARY SALT (SODIUM); DASH DIET AND BLOOD PRESSURE: To decrease the sodium in your diet:   Use fresh vegetables and foods as much as possible.  Avoid canned and processed foods. Cured meats such as catalan, ham, and sausages are high in salt.  Try using different herbs and spices in your cooking instead of salt.  In restaurants, avoid foods with sauces, cheese, and cured meats. Ask for low-sodium choices. To get more potassium in your diet, eat:  Bananas, fresh or dried apricots, peaches, citrus fruits, melons  Cauliflower, broccoli, tomatoes, carrots, raw spinach, beet greens, potatoes To get more magnesium in your diet, eat:  Whole grain foods, leafy green vegetables, dried fruits - Fish and seafood, poultry  To get more calcium in your diet, eat:  Nonfat milk, yogurt, and low-fat cheeses   [place] and sardines  Cooked dried beans  Broccoli, kale, and bok shane  Tofu or soybean curd DASH stands for "dietary approaches to stop hypertension." The DASH diet is low in total and saturated fat. It is rich in fruits, vegetables, and low-fat dairy foods. The diet allows you to get natural fiber, calcium, and magnesium from food. It prevents or lowers high blood pressure. It can also help lower cholesterol in your blood.  Don't change how you eat all at once. It's much more likely that you'll succeed if you make only one or two small changes at a time. Wait until those changes are a habit, then make a couple more changes. Some good starting steps include:  Add one serving of vegetables to your meals at lunch and dinner. This is an easy way to help you get more vegetables in your diet.  Have a piece of fruit as an afternoon or after-dinner snack. One glass of juice at breakfast is not enough fruit in your diet.  Use half your usual amount of butter, margarine, or salad dressing.  Buy nonfat salad dressing or nonfat sour cream. Follow this guide to select your menu of meals. The number of calories we want you to eat each day will tell you how many servings you can choose from each food group. Calories: 1600 2100 2600 3100  Servings Grains 6 7  10  12   Vegetables 	 4 4  5 6 Fruit 4 5 5 6 Dairy (low-fat) 2  3 3 3   Meat, poultry, fish  1  2 2   Nuts, seeds    1 Fats and sweets 1  2  3 4 Grains and grain products like breads and cereals provide energy, fiber and vitamins. Whole grains have more of these nutrients. One serving equals one of the following: Bagel, 1/2 medium; Barley, cooked 1/2 cup; Biscuit, country style 1 medium; Bread, whole wheat, white 1 slice; Cereals, cold or cooked, 1/2 cup; Cornbread, 1 medium piece; Crackers, liliana, 2; Crackers, saltine, 4; Dinner roll, 1medium; English muffin, ; Hamburger bun, ; Muffin, 1 medium; Pancake, 1 medium; Pasta, 1/2 cup cooked; [place], 1/2 large or 1 small; Popcorn, 1 cup popped; Pretzels, 1 ounce; Rice, white, brown, or wild, 1/2 cup cooked; Tortillas, corn or flour, 1 medium; Waffle, 1 medium; Wheat germ, 1/4 cup;  Vegetables are rich sources of potassium, magnesium, and fiber. One serving is 1/2 cup of any of the following cooked vegetables: Asparagus, Beans (green, yellow), Beets, Broccoli, [place] Sprouts, Carrots, Cauliflower, Ste[place], chicory, mustard and turnip (and other) greens, Corn, Kale, Lima beans, Mixed vegetables, Okra, Parsnips, Peas, green, Potatoes (1/2 medium or 1/2 cup mashed), Pumpkin, Rutabaga, Spaghetti or tomato sauce, Spinach, Squash (zucchini or yellow), Stewed tomatoes, Succotash, Sweet potatoes, Turnips, Yam  Raw vegetables: Carrots,1/2 cup; Celery, 1/2 cup; Lettuce (kymberly, loose-leaf, green-leaf), 1 cup; Peppers, 1/2 cup; Spinach, 1 cup; Tomato, 1/2 Fruits and fruit juices are important sources of potassium and magnesium. Fruits also contain fiber and are low in sodium and fat. One serving equals: Any fruit juice, # cup (6 ounces); Canned or frozen fruit,  cup (includes applesauce); Dried fruit,  cup;  Fresh fruit: Apple, 1 medium; Apricots, 2 medium; Banana, 1 medium; Berries, 1/2 cup; Melon, 1 wedge, or 1/2 cup; Cherries, 10; Grapefruit, 1/2; Grapes, 15; Kiwi, 1 medium; [place], 1/2 small; Nectarine, 1 medium; Orange, 1 medium; Peach, 1 medium; Pear, 1 medium; Pineapple, 1/2 cup; Plums, 2 medium; Tangerine, 1 large Dairy foods provide protein and calcium. Use low-fat or nonfat dairy products to cut down on fat. One serving equals: Skim milk, 1 cup (8 ounces); 1% low fat milk, 1 cup (8 ounces); 2% low fat milk, 1 cup (8 ounces) nonfat dry milk powder (1/3 cup); Low-fat cottage cheese, 1 cup (8 ounces); Parmesan cheese, 1 tablespoon; Mozzarella cheese, part skim, 1/4 cup (1 ounce); Low-fat cheddar cheese, 11/2 ounces; Ricotta cheese, part skim milk or nonfat, 1/4 cup (11/2 ounces); Other low fat or nonfat cheeses (11/2 oz.); Low-fat or nonfat yogurt, fruit-flavored or plain, 1 cup (8 ounces) Low-fat or nonfat frozen yogurt, 1/2 cup (4 ounces); Note: People who can't digest dairy products can try taking lactase enzyme pills or drops (available at drug and grocery stores) when they eat dairy. There is also milk available with the enzyme already added. Or you can buy lactose-free milk. Meat, poultry, and fish are good sources of protein and magnesium. One serving equals: Lean meat including beef, veal, or pork, 3 ounces cooked; Skinless, white meat poultry including turkey, chicken, 3 ounces; Fish and shellfish, 3 ounces cooked; Low-fat luncheon meats, 1 ounce; Egg, 1 medium; Tofu, 3 ounces Note: Three ounces of cooked meat is about the size of a deck of cards. Nuts, seeds, and legumes are rich sources of energy, magnesium, potassium, protein and fiber. Nuts and seeds are also high in fat, so portions should be small. Almonds, 1/3 cup; Beans such as kidney, chew, and navy, 1/2 cup cooked; Chickpeas and lentils, 1/2 cup cooked; Cashews, 1/3 cup; Filberts, 1/3 cup; Mixed nuts, 1/3 cup; Peanut butter, 2 tablespoons; Peanuts, 1/3 cup; Sesame seeds, 2 tablespoons; Sunflower seeds, 2 tablespoons; Tofu, regular, 3 ounces; Walnuts, 1/3 cup  Following the above diet will give you about 27% fat in your diet. The goal is to have 30% or less of the calories you eat each day be from fat. To meet that goal, do not eat more than 2-3 servings daily of added fat. Also try to limit sweets. One serving equals: Butter or margarine, 1 teaspoon; Mayonnaise, 1 teaspoon; Low-fat mayonnaise, 1 tablespoon; Salad dressing, 1 tablespoon; Low-fat salad dressing, 2 tablespoons; Oil, 1 teaspoon (use olive, canola, safflower, or other vegetable oils); Candy, hard, 3 pieces; Jelly or jam, 1 tablespoon); Jell-O, 1/2 cup; Jelly beans, 1/2 ounce; Maple syrup, 1 tablespoon; Popsicle, 1; Sherbet or nonfat or low-fat frozen yogurt, 1/2 cup; Sugar, 1 tablespoon; Sugared lemonade or fruit punch, 1 cup (8 ounces); Note: Try diet fruit-flavored gelatin or frozen, canned, or fresh fruit for dessert.  Small amounts of alcohol may have benefits to the heart and blood pressure. However, excess use of alcohol can cause damage to the brain, liver and other organs. It can lead to high blood pressure. Drinking more than two drinks (15 ml)  every day can raise your blood pressure. 15 ml of alcohol equals:  - one 12-ounce bottle of beer  - a half glass (5 ounces) of wine  - 1 ounce (one shot) of 100 proof hard liquor  NASAL SALINE WASHING: Nasal saline washing is useful during periods of sinusitis or nasal congestion (conditions which may cause excessive snoring and sleep apnea). I recommend twice daily nasal washing using Nasaline Irrigation System .  This may be purchased at low cost through [place]. Nasal saline washing should be done twice daily, preferably at bedtime (to help sleeping) and after showering (since the steam from a shower helps with the process).  The basic  principles of nasal washing are as follows:  1. Mix  cup of table salt and  cup of baking soda, store this in a dry container with a lid.  2. Each time you need to make a saline solution, take  teaspoon of the dry mixture and mix it in a cup of warm water.  3. Using a soft rubber bulb syringe, fill the syringe with the saline solution and squeeze the bulb into one nostril and then the other. Lean over a sink and let the saline drip out. Gently blow your nose to remove mucus. Repeat the process again.  4. Now squeeze the bulb again into one nostril while sniffing the saline into the back of your nose and throat. Use moderate force when you squeeze the bulb. The saline will go back down your throat and may make you gag. Just clear your throat and spit it out. Do this in each nostril once or twice.   5. When your nose is clear, gently blow and dry. You may now use nasal sprays if prescribed.  6. Clean the bulb syringe with mild bleach and water solution once a week. Be sure to rinse it well.   7. If the saline solution stings or burns, change the amount of salt or baking soda in the dry mixture or the amount of water used to make the solution.

## 2024-05-31 NOTE — CARDIOLOGY SUMMARY
[de-identified] : 5/31/24, normal sinus rhythm at 64 bpm, with normal ST-segments and T-waves. Prior ECG 4/22/24 showed sinus bradycardia at 59 bpm. ST-segments and T-waves appeared normal. Rhythm / RR - interval analysis 5/31/24 observed 87 beats over 90 sec with mean HR 60 bpm; mean  ms ([phone]); Max/Min 257%; RR SD 93 and RR CV 9%. There were 2 PVCs [de-identified] : Hemodynamics:  Time		SBP	DBP	MAP	HR	O2 Sat	Position 17:58		131	69	91	64	97	[facility]in[facility] L Seated 5/31/24 18:01		116	69	87	65	98	Din[facility]ap L Seated 5/31/24 18:02		122	66	88	64	98	[facility]in[facility]ap L Seated 5/31/24 18:03		124	69	90	67	97	Dinamap L Seated 5/31/24 16:03		125	68	89	66	97	D[facility]ap L Seated 5/31/24 16:04		130	70	91	65	94	Dinamap L Seated 5/31/24 16:05		126	68	89	66	98	Din[facility] L Seated 5/31/24 16:05		124	62	87	65	98	Din[facility]ap L Seated 5/31/24 18:06		108	64	83	66	97	Dinamap L Seated 5/31/24 18:06		117	67	87	68	97	Dinamap L Seated 5/31/24 18:11		131	74	92	60	99	Dinamap R Seated 5/31/24 18:12		130	68	90	65	99	[facility] R Seated 5/31/24 18:12		126	62	87	64	99	[facility] R Seated 5/31/24 18:13		125	72	91	67	99	[facility] R Seated 5/31/24 18:14		128	69	91	66	98	[facility] R Seated 5/31/24 18:14		125	69	90	66	98	[facility] R Seated 5/31/24 18:16		122	65	87	64	99	[facility] R Standing 5/31/24 18:19		129	69	90	66	99	[facility] R Standing 5/31/24 18:19		109	72	86	68	100	[facility] R Standing 5/31/24 18:20		121	63	85	69	100	[facility] R Standing 5/31/24 18:20		127	64	87	68	99	[facility] R Standing 5/31/24 Mean		124	68	88	66	98	[facility] All 5/31/24 SD		6.5	3.3	2.3	2.0	1.3	[facility] All 5/31/24 Med		125	68	89	66	98	[facility] All 5/31/24 Max		131	74	92	69	100	[facility] All 5/31/24 Min		108	62	83	60	94	[facility] All 5/31/24 T-test		0.05	0.36	0.08	0.43	0.003	[facility] L vs R Seated 5/31/24 T-test		0.17	0.34	0.02	0.12	0.05	[facility] Sit vs Stand 5/31/24 T-test		0.01	0.39	0.04	9.E-06	4.E-05	[facility] All 5/31/24 vs 5/8/19

## 2024-06-14 ENCOUNTER — NON-APPOINTMENT (OUTPATIENT)
Age: 64
End: 2024-06-14

## 2024-06-14 DIAGNOSIS — I49.3 VENTRICULAR PREMATURE DEPOLARIZATION: ICD-10-CM

## 2024-06-29 ENCOUNTER — APPOINTMENT (OUTPATIENT)
Dept: FAMILY MEDICINE | Facility: CLINIC | Age: 64
End: 2024-06-29
Payer: MEDICARE

## 2024-06-29 VITALS
BODY MASS INDEX: 29.03 KG/M2 | HEART RATE: 78 BPM | TEMPERATURE: 98.1 F | OXYGEN SATURATION: 98 % | DIASTOLIC BLOOD PRESSURE: 81 MMHG | SYSTOLIC BLOOD PRESSURE: 135 MMHG | RESPIRATION RATE: 16 BRPM | HEIGHT: 67 IN | WEIGHT: 185 LBS

## 2024-06-29 DIAGNOSIS — R11.0 NAUSEA: ICD-10-CM

## 2024-06-29 PROCEDURE — 99213 OFFICE O/P EST LOW 20 MIN: CPT

## 2024-07-25 ENCOUNTER — RX RENEWAL (OUTPATIENT)
Age: 64
End: 2024-07-25

## 2024-07-26 ENCOUNTER — APPOINTMENT (OUTPATIENT)
Dept: CV DIAGNOSITCS | Facility: HOSPITAL | Age: 64
End: 2024-07-26

## 2024-07-26 ENCOUNTER — RESULT REVIEW (OUTPATIENT)
Age: 64
End: 2024-07-26

## 2024-08-06 ENCOUNTER — NON-APPOINTMENT (OUTPATIENT)
Age: 64
End: 2024-08-06

## 2024-08-13 ENCOUNTER — RX RENEWAL (OUTPATIENT)
Age: 64
End: 2024-08-13

## 2024-10-07 ENCOUNTER — APPOINTMENT (OUTPATIENT)
Dept: FAMILY MEDICINE | Facility: CLINIC | Age: 64
End: 2024-10-07
Payer: MEDICARE

## 2024-10-07 VITALS
WEIGHT: 191 LBS | BODY MASS INDEX: 29.98 KG/M2 | RESPIRATION RATE: 16 BRPM | OXYGEN SATURATION: 98 % | TEMPERATURE: 97.6 F | SYSTOLIC BLOOD PRESSURE: 140 MMHG | HEART RATE: 62 BPM | DIASTOLIC BLOOD PRESSURE: 80 MMHG | HEIGHT: 67 IN

## 2024-10-07 DIAGNOSIS — Z00.00 ENCOUNTER FOR GENERAL ADULT MEDICAL EXAMINATION W/OUT ABNORMAL FINDINGS: ICD-10-CM

## 2024-10-07 DIAGNOSIS — Z87.891 PERSONAL HISTORY OF NICOTINE DEPENDENCE: ICD-10-CM

## 2024-10-07 DIAGNOSIS — I10 ESSENTIAL (PRIMARY) HYPERTENSION: ICD-10-CM

## 2024-10-07 DIAGNOSIS — Z12.5 ENCOUNTER FOR SCREENING FOR MALIGNANT NEOPLASM OF PROSTATE: ICD-10-CM

## 2024-10-07 DIAGNOSIS — Z23 ENCOUNTER FOR IMMUNIZATION: ICD-10-CM

## 2024-10-07 DIAGNOSIS — E78.5 HYPERLIPIDEMIA, UNSPECIFIED: ICD-10-CM

## 2024-10-07 DIAGNOSIS — Z13.1 ENCOUNTER FOR SCREENING FOR DIABETES MELLITUS: ICD-10-CM

## 2024-10-07 DIAGNOSIS — E55.9 VITAMIN D DEFICIENCY, UNSPECIFIED: ICD-10-CM

## 2024-10-07 PROCEDURE — G0008: CPT

## 2024-10-07 PROCEDURE — G0439: CPT

## 2024-10-07 PROCEDURE — 90662 IIV NO PRSV INCREASED AG IM: CPT

## 2024-10-11 LAB
25(OH)D3 SERPL-MCNC: 36.4 NG/ML
ALBUMIN SERPL ELPH-MCNC: 4.3 G/DL
ALP BLD-CCNC: 81 U/L
ALT SERPL-CCNC: 22 U/L
ANION GAP SERPL CALC-SCNC: 15 MMOL/L
APPEARANCE: CLEAR
AST SERPL-CCNC: 30 U/L
BASOPHILS # BLD AUTO: 0.03 K/UL
BASOPHILS NFR BLD AUTO: 0.6 %
BILIRUB SERPL-MCNC: 0.5 MG/DL
BILIRUBIN URINE: NEGATIVE
BLOOD URINE: NEGATIVE
BUN SERPL-MCNC: 8 MG/DL
CALCIUM SERPL-MCNC: 9.3 MG/DL
CHLORIDE SERPL-SCNC: 88 MMOL/L
CHOLEST SERPL-MCNC: 165 MG/DL
CO2 SERPL-SCNC: 24 MMOL/L
COLOR: YELLOW
CREAT SERPL-MCNC: 0.96 MG/DL
EGFR: 87 ML/MIN/1.73M2
EOSINOPHIL # BLD AUTO: 0.09 K/UL
EOSINOPHIL NFR BLD AUTO: 1.9 %
ESTIMATED AVERAGE GLUCOSE: 114 MG/DL
GLUCOSE QUALITATIVE U: NEGATIVE MG/DL
GLUCOSE SERPL-MCNC: 101 MG/DL
HBA1C MFR BLD HPLC: 5.6 %
HCT VFR BLD CALC: 39 %
HDLC SERPL-MCNC: 50 MG/DL
HGB BLD-MCNC: 14.1 G/DL
IMM GRANULOCYTES NFR BLD AUTO: 1.1 %
KETONES URINE: NEGATIVE MG/DL
LDLC SERPL CALC-MCNC: 98 MG/DL
LEUKOCYTE ESTERASE URINE: NEGATIVE
LYMPHOCYTES # BLD AUTO: 0.79 K/UL
LYMPHOCYTES NFR BLD AUTO: 16.8 %
MAN DIFF?: NORMAL
MCHC RBC-ENTMCNC: 34.8 PG
MCHC RBC-ENTMCNC: 36.2 GM/DL
MCV RBC AUTO: 96.3 FL
MONOCYTES # BLD AUTO: 0.59 K/UL
MONOCYTES NFR BLD AUTO: 12.5 %
NEUTROPHILS # BLD AUTO: 3.16 K/UL
NEUTROPHILS NFR BLD AUTO: 67.1 %
NITRITE URINE: NEGATIVE
NONHDLC SERPL-MCNC: 115 MG/DL
PH URINE: 7
PLATELET # BLD AUTO: 255 K/UL
POTASSIUM SERPL-SCNC: 4.5 MMOL/L
PROT SERPL-MCNC: 6.9 G/DL
PROTEIN URINE: NEGATIVE MG/DL
PSA SERPL-MCNC: 0.66 NG/ML
RBC # BLD: 4.05 M/UL
RBC # FLD: 11.3 %
SODIUM SERPL-SCNC: 126 MMOL/L
SPECIFIC GRAVITY URINE: 1.01
TRIGL SERPL-MCNC: 89 MG/DL
TSH SERPL-ACNC: 0.69 UIU/ML
UROBILINOGEN URINE: 0.2 MG/DL
WBC # FLD AUTO: 4.71 K/UL

## 2024-10-12 ENCOUNTER — APPOINTMENT (OUTPATIENT)
Dept: FAMILY MEDICINE | Facility: CLINIC | Age: 64
End: 2024-10-12

## 2024-10-12 DIAGNOSIS — E87.1 HYPO-OSMOLALITY AND HYPONATREMIA: ICD-10-CM

## 2024-10-16 LAB
ANION GAP SERPL CALC-SCNC: 15 MMOL/L
BUN SERPL-MCNC: 11 MG/DL
CALCIUM SERPL-MCNC: 9.8 MG/DL
CHLORIDE SERPL-SCNC: 93 MMOL/L
CO2 SERPL-SCNC: 25 MMOL/L
CREAT SERPL-MCNC: 0.96 MG/DL
EGFR: 87 ML/MIN/1.73M2
GLUCOSE SERPL-MCNC: 111 MG/DL
POTASSIUM SERPL-SCNC: 4.6 MMOL/L
SODIUM SERPL-SCNC: 133 MMOL/L

## 2024-10-22 ENCOUNTER — NON-APPOINTMENT (OUTPATIENT)
Age: 64
End: 2024-10-22

## 2024-10-22 VITALS — BODY MASS INDEX: 29.98 KG/M2 | HEIGHT: 67 IN | WEIGHT: 191 LBS

## 2024-10-22 DIAGNOSIS — Z87.891 PERSONAL HISTORY OF NICOTINE DEPENDENCE: ICD-10-CM

## 2024-11-01 ENCOUNTER — APPOINTMENT (OUTPATIENT)
Dept: CT IMAGING | Facility: CLINIC | Age: 64
End: 2024-11-01

## 2024-11-01 PROCEDURE — 71271 CT THORAX LUNG CANCER SCR C-: CPT | Mod: TC

## 2024-11-02 ENCOUNTER — RX RENEWAL (OUTPATIENT)
Age: 64
End: 2024-11-02

## 2024-12-16 ENCOUNTER — RX RENEWAL (OUTPATIENT)
Age: 64
End: 2024-12-16

## 2025-01-10 ENCOUNTER — RX RENEWAL (OUTPATIENT)
Age: 65
End: 2025-01-10

## 2025-04-10 ENCOUNTER — RX RENEWAL (OUTPATIENT)
Age: 65
End: 2025-04-10

## 2025-05-08 ENCOUNTER — RX RENEWAL (OUTPATIENT)
Age: 65
End: 2025-05-08

## 2025-05-08 ENCOUNTER — APPOINTMENT (OUTPATIENT)
Dept: ULTRASOUND IMAGING | Facility: CLINIC | Age: 65
End: 2025-05-08
Payer: MEDICARE

## 2025-05-08 PROCEDURE — 76700 US EXAM ABDOM COMPLETE: CPT

## 2025-05-12 ENCOUNTER — APPOINTMENT (OUTPATIENT)
Dept: FAMILY MEDICINE | Facility: CLINIC | Age: 65
End: 2025-05-12

## 2025-06-06 ENCOUNTER — APPOINTMENT (OUTPATIENT)
Dept: FAMILY MEDICINE | Facility: CLINIC | Age: 65
End: 2025-06-06
Payer: MEDICARE

## 2025-06-06 VITALS
WEIGHT: 179.31 LBS | DIASTOLIC BLOOD PRESSURE: 94 MMHG | HEART RATE: 64 BPM | RESPIRATION RATE: 16 BRPM | OXYGEN SATURATION: 99 % | TEMPERATURE: 97.8 F | HEIGHT: 67 IN | BODY MASS INDEX: 28.14 KG/M2 | SYSTOLIC BLOOD PRESSURE: 156 MMHG

## 2025-06-06 VITALS — DIASTOLIC BLOOD PRESSURE: 88 MMHG | SYSTOLIC BLOOD PRESSURE: 138 MMHG

## 2025-06-06 PROCEDURE — 99214 OFFICE O/P EST MOD 30 MIN: CPT

## 2025-06-06 PROCEDURE — G2211 COMPLEX E/M VISIT ADD ON: CPT

## 2025-06-08 LAB
ALBUMIN SERPL ELPH-MCNC: 4.6 G/DL
ALP BLD-CCNC: 98 U/L
ALT SERPL-CCNC: 28 U/L
ANION GAP SERPL CALC-SCNC: 15 MMOL/L
AST SERPL-CCNC: 38 U/L
BASOPHILS # BLD AUTO: 0.06 K/UL
BASOPHILS NFR BLD AUTO: 1.4 %
BILIRUB SERPL-MCNC: 0.6 MG/DL
BUN SERPL-MCNC: 10 MG/DL
CALCIUM SERPL-MCNC: 9.8 MG/DL
CHLORIDE SERPL-SCNC: 91 MMOL/L
CHOLEST SERPL-MCNC: 175 MG/DL
CO2 SERPL-SCNC: 24 MMOL/L
CREAT SERPL-MCNC: 0.91 MG/DL
EGFRCR SERPLBLD CKD-EPI 2021: 92 ML/MIN/1.73M2
EOSINOPHIL # BLD AUTO: 0.1 K/UL
EOSINOPHIL NFR BLD AUTO: 2.3 %
ESTIMATED AVERAGE GLUCOSE: 114 MG/DL
GLUCOSE SERPL-MCNC: 105 MG/DL
HBA1C MFR BLD HPLC: 5.6 %
HCT VFR BLD CALC: 40.4 %
HDLC SERPL-MCNC: 56 MG/DL
HGB BLD-MCNC: 14.2 G/DL
IMM GRANULOCYTES NFR BLD AUTO: 0.7 %
LDLC SERPL-MCNC: 105 MG/DL
LYMPHOCYTES # BLD AUTO: 0.91 K/UL
LYMPHOCYTES NFR BLD AUTO: 20.7 %
MAN DIFF?: NORMAL
MCHC RBC-ENTMCNC: 33.8 PG
MCHC RBC-ENTMCNC: 35.1 G/DL
MCV RBC AUTO: 96.2 FL
MONOCYTES # BLD AUTO: 0.58 K/UL
MONOCYTES NFR BLD AUTO: 13.2 %
NEUTROPHILS # BLD AUTO: 2.72 K/UL
NEUTROPHILS NFR BLD AUTO: 61.7 %
NONHDLC SERPL-MCNC: 119 MG/DL
PLATELET # BLD AUTO: 303 K/UL
POTASSIUM SERPL-SCNC: 4.1 MMOL/L
PROT SERPL-MCNC: 7.2 G/DL
RBC # BLD: 4.2 M/UL
RBC # FLD: 11.8 %
SODIUM SERPL-SCNC: 130 MMOL/L
TRIGL SERPL-MCNC: 71 MG/DL
TSH SERPL-ACNC: 0.6 UIU/ML
WBC # FLD AUTO: 4.4 K/UL

## 2025-06-11 ENCOUNTER — APPOINTMENT (OUTPATIENT)
Dept: CARDIOLOGY | Facility: CLINIC | Age: 65
End: 2025-06-11
Payer: MEDICARE

## 2025-06-11 VITALS
SYSTOLIC BLOOD PRESSURE: 119 MMHG | DIASTOLIC BLOOD PRESSURE: 62 MMHG | OXYGEN SATURATION: 100 % | HEART RATE: 55 BPM | RESPIRATION RATE: 16 BRPM

## 2025-06-11 VITALS — HEART RATE: 56 BPM | DIASTOLIC BLOOD PRESSURE: 65 MMHG | OXYGEN SATURATION: 100 % | SYSTOLIC BLOOD PRESSURE: 136 MMHG

## 2025-06-11 VITALS — OXYGEN SATURATION: 100 % | HEART RATE: 60 BPM | DIASTOLIC BLOOD PRESSURE: 67 MMHG | SYSTOLIC BLOOD PRESSURE: 115 MMHG

## 2025-06-11 VITALS — SYSTOLIC BLOOD PRESSURE: 120 MMHG | DIASTOLIC BLOOD PRESSURE: 65 MMHG | HEART RATE: 58 BPM | OXYGEN SATURATION: 100 %

## 2025-06-11 VITALS
SYSTOLIC BLOOD PRESSURE: 142 MMHG | BODY MASS INDEX: 28.25 KG/M2 | OXYGEN SATURATION: 100 % | HEART RATE: 54 BPM | RESPIRATION RATE: 16 BRPM | HEIGHT: 67 IN | DIASTOLIC BLOOD PRESSURE: 67 MMHG | WEIGHT: 180 LBS

## 2025-06-11 PROBLEM — R07.89 CHEST PAIN, ATYPICAL: Status: ACTIVE | Noted: 2025-06-11

## 2025-06-11 PROBLEM — R73.03 PRE-DIABETES: Status: ACTIVE | Noted: 2025-06-06

## 2025-06-11 PROCEDURE — G2212 PROLONG OUTPT/OFFICE VIS: CPT

## 2025-06-11 PROCEDURE — 93040 RHYTHM ECG WITH REPORT: CPT | Mod: 59

## 2025-06-11 PROCEDURE — 93000 ELECTROCARDIOGRAM COMPLETE: CPT

## 2025-06-11 PROCEDURE — 99215 OFFICE O/P EST HI 40 MIN: CPT

## 2025-06-30 ENCOUNTER — OUTPATIENT (OUTPATIENT)
Dept: OUTPATIENT SERVICES | Facility: HOSPITAL | Age: 65
LOS: 1 days | End: 2025-06-30
Payer: MEDICARE

## 2025-06-30 ENCOUNTER — RESULT REVIEW (OUTPATIENT)
Age: 65
End: 2025-06-30

## 2025-06-30 ENCOUNTER — APPOINTMENT (OUTPATIENT)
Dept: CV DIAGNOSTICS | Facility: HOSPITAL | Age: 65
End: 2025-06-30

## 2025-06-30 DIAGNOSIS — R07.89 OTHER CHEST PAIN: ICD-10-CM

## 2025-06-30 DIAGNOSIS — R06.09 OTHER FORMS OF DYSPNEA: ICD-10-CM

## 2025-06-30 PROCEDURE — 93018 CV STRESS TEST I&R ONLY: CPT | Mod: GC

## 2025-06-30 PROCEDURE — 78451 HT MUSCLE IMAGE SPECT SING: CPT | Mod: 26

## 2025-06-30 PROCEDURE — 93016 CV STRESS TEST SUPVJ ONLY: CPT | Mod: GC

## 2025-07-01 ENCOUNTER — APPOINTMENT (OUTPATIENT)
Dept: CARDIOLOGY | Facility: CLINIC | Age: 65
End: 2025-07-01
Payer: MEDICARE

## 2025-07-01 PROBLEM — E66.811 OBESITY, CLASS I, BMI 30-34.9: Status: ACTIVE | Noted: 2024-05-30

## 2025-07-01 PROCEDURE — 99215 OFFICE O/P EST HI 40 MIN: CPT | Mod: 2W

## 2025-07-08 ENCOUNTER — APPOINTMENT (OUTPATIENT)
Dept: CARDIOLOGY | Facility: CLINIC | Age: 65
End: 2025-07-08
Payer: MEDICARE

## 2025-07-08 PROCEDURE — 93784 AMBL BP MNTR W/SOFTWARE: CPT

## 2025-07-13 PROBLEM — R03.0 WHITE COAT SYNDROME WITHOUT DIAGNOSIS OF HYPERTENSION: Status: ACTIVE | Noted: 2025-07-13

## 2025-07-15 ENCOUNTER — APPOINTMENT (OUTPATIENT)
Dept: CARDIOLOGY | Facility: CLINIC | Age: 65
End: 2025-07-15
Payer: MEDICARE

## 2025-07-15 ENCOUNTER — NON-APPOINTMENT (OUTPATIENT)
Age: 65
End: 2025-07-15

## 2025-07-15 VITALS — DIASTOLIC BLOOD PRESSURE: 99 MMHG | SYSTOLIC BLOOD PRESSURE: 164 MMHG | HEART RATE: 80 BPM

## 2025-07-15 VITALS
DIASTOLIC BLOOD PRESSURE: 95 MMHG | SYSTOLIC BLOOD PRESSURE: 183 MMHG | HEIGHT: 67 IN | HEART RATE: 79 BPM | WEIGHT: 179 LBS | BODY MASS INDEX: 28.09 KG/M2

## 2025-07-15 VITALS — HEART RATE: 76 BPM | DIASTOLIC BLOOD PRESSURE: 100 MMHG | SYSTOLIC BLOOD PRESSURE: 168 MMHG

## 2025-07-15 PROBLEM — G47.33 OSA (OBSTRUCTIVE SLEEP APNEA): Status: ACTIVE | Noted: 2025-07-01

## 2025-07-15 PROCEDURE — 99215 OFFICE O/P EST HI 40 MIN: CPT | Mod: 2W

## 2025-07-15 RX ORDER — AMLODIPINE BESYLATE 2.5 MG/1
2.5 TABLET ORAL DAILY
Qty: 30 | Refills: 1 | Status: ACTIVE | COMMUNITY
Start: 2025-07-15 | End: 1900-01-01

## 2025-07-16 ENCOUNTER — APPOINTMENT (OUTPATIENT)
Dept: FAMILY MEDICINE | Facility: CLINIC | Age: 65
End: 2025-07-16
Payer: MEDICARE

## 2025-07-16 VITALS
DIASTOLIC BLOOD PRESSURE: 90 MMHG | HEART RATE: 68 BPM | RESPIRATION RATE: 17 BRPM | SYSTOLIC BLOOD PRESSURE: 172 MMHG | BODY MASS INDEX: 28.09 KG/M2 | OXYGEN SATURATION: 98 % | WEIGHT: 179 LBS | HEIGHT: 67 IN

## 2025-07-16 PROCEDURE — 99214 OFFICE O/P EST MOD 30 MIN: CPT

## 2025-07-16 PROCEDURE — 36415 COLL VENOUS BLD VENIPUNCTURE: CPT

## 2025-07-16 PROCEDURE — G2211 COMPLEX E/M VISIT ADD ON: CPT

## 2025-07-17 LAB
ALBUMIN SERPL ELPH-MCNC: 4.2 G/DL
ALP BLD-CCNC: 103 U/L
ALT SERPL-CCNC: 23 U/L
ANION GAP SERPL CALC-SCNC: 15 MMOL/L
AST SERPL-CCNC: 22 U/L
BASOPHILS # BLD AUTO: 0.05 K/UL
BASOPHILS NFR BLD AUTO: 1 %
BILIRUB SERPL-MCNC: 0.5 MG/DL
BUN SERPL-MCNC: 9 MG/DL
CALCIUM SERPL-MCNC: 9.4 MG/DL
CHLORIDE SERPL-SCNC: 106 MMOL/L
CHOLEST SERPL-MCNC: 163 MG/DL
CO2 SERPL-SCNC: 21 MMOL/L
CREAT SERPL-MCNC: 0.88 MG/DL
EGFRCR SERPLBLD CKD-EPI 2021: 94 ML/MIN/1.73M2
EOSINOPHIL # BLD AUTO: 0.2 K/UL
EOSINOPHIL NFR BLD AUTO: 4.1 %
GLUCOSE SERPL-MCNC: 94 MG/DL
HCT VFR BLD CALC: 40.9 %
HDLC SERPL-MCNC: 53 MG/DL
HGB BLD-MCNC: 13.9 G/DL
IMM GRANULOCYTES NFR BLD AUTO: 0.4 %
LDLC SERPL-MCNC: 98 MG/DL
LYMPHOCYTES # BLD AUTO: 1.11 K/UL
LYMPHOCYTES NFR BLD AUTO: 22.8 %
MAN DIFF?: NORMAL
MCHC RBC-ENTMCNC: 34 G/DL
MCHC RBC-ENTMCNC: 34.2 PG
MCV RBC AUTO: 100.7 FL
MONOCYTES # BLD AUTO: 0.64 K/UL
MONOCYTES NFR BLD AUTO: 13.2 %
NEUTROPHILS # BLD AUTO: 2.84 K/UL
NEUTROPHILS NFR BLD AUTO: 58.5 %
NONHDLC SERPL-MCNC: 110 MG/DL
PLATELET # BLD AUTO: 217 K/UL
POTASSIUM SERPL-SCNC: 4.3 MMOL/L
PROT SERPL-MCNC: 6.7 G/DL
RBC # BLD: 4.06 M/UL
RBC # FLD: 12.3 %
SODIUM SERPL-SCNC: 141 MMOL/L
TRIGL SERPL-MCNC: 61 MG/DL
TSH SERPL-ACNC: 0.63 UIU/ML
WBC # FLD AUTO: 4.86 K/UL

## 2025-08-07 ENCOUNTER — APPOINTMENT (OUTPATIENT)
Dept: ORTHOPEDIC SURGERY | Facility: CLINIC | Age: 65
End: 2025-08-07
Payer: MEDICARE

## 2025-08-07 ENCOUNTER — RX CHANGE (OUTPATIENT)
Age: 65
End: 2025-08-07

## 2025-08-07 ENCOUNTER — NON-APPOINTMENT (OUTPATIENT)
Age: 65
End: 2025-08-07

## 2025-08-07 VITALS — BODY MASS INDEX: 28.09 KG/M2 | HEIGHT: 67 IN | WEIGHT: 179 LBS

## 2025-08-07 DIAGNOSIS — M50.30 OTHER CERVICAL DISC DEGENERATION, UNSPECIFIED CERVICAL REGION: ICD-10-CM

## 2025-08-07 PROCEDURE — 99204 OFFICE O/P NEW MOD 45 MIN: CPT

## 2025-08-07 PROCEDURE — 72040 X-RAY EXAM NECK SPINE 2-3 VW: CPT

## 2025-08-07 RX ORDER — AMLODIPINE BESYLATE 2.5 MG/1
2.5 TABLET ORAL
Qty: 90 | Refills: 1 | Status: ACTIVE | COMMUNITY
Start: 1900-01-01 | End: 1900-01-01

## 2025-08-07 RX ORDER — TIZANIDINE 2 MG/1
2 TABLET ORAL
Qty: 30 | Refills: 1 | Status: ACTIVE | COMMUNITY
Start: 2025-08-07 | End: 1900-01-01

## 2025-08-07 RX ORDER — MELOXICAM 15 MG/1
15 TABLET ORAL
Qty: 30 | Refills: 1 | Status: ACTIVE | COMMUNITY
Start: 2025-08-07 | End: 1900-01-01

## 2025-08-26 ENCOUNTER — APPOINTMENT (OUTPATIENT)
Dept: CARDIOLOGY | Facility: CLINIC | Age: 65
End: 2025-08-26
Payer: MEDICARE

## 2025-08-26 VITALS — HEART RATE: 67 BPM | SYSTOLIC BLOOD PRESSURE: 138 MMHG | DIASTOLIC BLOOD PRESSURE: 80 MMHG

## 2025-08-26 VITALS — HEART RATE: 68 BPM | SYSTOLIC BLOOD PRESSURE: 146 MMHG | DIASTOLIC BLOOD PRESSURE: 79 MMHG

## 2025-08-26 VITALS — DIASTOLIC BLOOD PRESSURE: 80 MMHG | SYSTOLIC BLOOD PRESSURE: 144 MMHG | HEART RATE: 67 BPM

## 2025-08-26 VITALS
RESPIRATION RATE: 12 BRPM | WEIGHT: 177 LBS | DIASTOLIC BLOOD PRESSURE: 88 MMHG | HEIGHT: 67 IN | SYSTOLIC BLOOD PRESSURE: 146 MMHG | HEART RATE: 68 BPM | BODY MASS INDEX: 27.78 KG/M2

## 2025-08-26 VITALS — DIASTOLIC BLOOD PRESSURE: 88 MMHG | HEART RATE: 70 BPM | SYSTOLIC BLOOD PRESSURE: 144 MMHG

## 2025-08-26 DIAGNOSIS — E78.5 HYPERLIPIDEMIA, UNSPECIFIED: ICD-10-CM

## 2025-08-26 DIAGNOSIS — E87.1 HYPO-OSMOLALITY AND HYPONATREMIA: ICD-10-CM

## 2025-08-26 DIAGNOSIS — R00.2 PALPITATIONS: ICD-10-CM

## 2025-08-26 DIAGNOSIS — I10 ESSENTIAL (PRIMARY) HYPERTENSION: ICD-10-CM

## 2025-08-26 DIAGNOSIS — G47.33 OBSTRUCTIVE SLEEP APNEA (ADULT) (PEDIATRIC): ICD-10-CM

## 2025-08-26 DIAGNOSIS — E66.3 OVERWEIGHT: ICD-10-CM

## 2025-08-26 DIAGNOSIS — E66.811 OBESITY, CLASS 1: ICD-10-CM

## 2025-08-26 PROCEDURE — 99215 OFFICE O/P EST HI 40 MIN: CPT | Mod: 2W

## 2025-08-26 RX ORDER — DICLOFENAC SODIUM 3 G/100G
3 GEL TOPICAL TWICE DAILY
Qty: 100 | Refills: 3 | Status: ACTIVE | COMMUNITY
Start: 2025-08-26 | End: 1900-01-01

## 2025-09-15 ENCOUNTER — APPOINTMENT (OUTPATIENT)
Dept: ORTHOPEDIC SURGERY | Facility: CLINIC | Age: 65
End: 2025-09-15